# Patient Record
Sex: MALE | Race: WHITE | NOT HISPANIC OR LATINO | Employment: FULL TIME | URBAN - METROPOLITAN AREA
[De-identification: names, ages, dates, MRNs, and addresses within clinical notes are randomized per-mention and may not be internally consistent; named-entity substitution may affect disease eponyms.]

---

## 2017-01-04 ENCOUNTER — APPOINTMENT (OUTPATIENT)
Dept: LAB | Facility: CLINIC | Age: 67
End: 2017-01-04
Payer: COMMERCIAL

## 2017-01-04 ENCOUNTER — TRANSCRIBE ORDERS (OUTPATIENT)
Dept: LAB | Facility: CLINIC | Age: 67
End: 2017-01-04

## 2017-01-04 DIAGNOSIS — N40.1 ENLARGED PROSTATE WITH URINARY OBSTRUCTION: Primary | ICD-10-CM

## 2017-01-04 DIAGNOSIS — Z00.01 ENCOUNTER FOR GENERAL ADULT MEDICAL EXAMINATION WITH ABNORMAL FINDINGS: ICD-10-CM

## 2017-01-04 DIAGNOSIS — R53.83 OTHER MALAISE AND FATIGUE: ICD-10-CM

## 2017-01-04 DIAGNOSIS — E78.2 MIXED HYPERLIPIDEMIA: ICD-10-CM

## 2017-01-04 DIAGNOSIS — E55.9 UNSPECIFIED VITAMIN D DEFICIENCY: ICD-10-CM

## 2017-01-04 DIAGNOSIS — R53.81 OTHER MALAISE AND FATIGUE: ICD-10-CM

## 2017-01-04 DIAGNOSIS — N13.8 ENLARGED PROSTATE WITH URINARY OBSTRUCTION: Primary | ICD-10-CM

## 2017-01-04 DIAGNOSIS — E03.9 UNSPECIFIED HYPOTHYROIDISM: ICD-10-CM

## 2017-01-04 DIAGNOSIS — D64.9 ANEMIA, UNSPECIFIED: ICD-10-CM

## 2017-01-04 LAB — VENIPUNCTURE: NORMAL

## 2017-01-04 PROCEDURE — 36415 COLL VENOUS BLD VENIPUNCTURE: CPT

## 2017-01-05 ENCOUNTER — TRANSCRIBE ORDERS (OUTPATIENT)
Dept: LAB | Facility: CLINIC | Age: 67
End: 2017-01-05

## 2017-03-17 ENCOUNTER — APPOINTMENT (OUTPATIENT)
Dept: LAB | Facility: CLINIC | Age: 67
End: 2017-03-17
Payer: COMMERCIAL

## 2017-03-17 ENCOUNTER — TRANSCRIBE ORDERS (OUTPATIENT)
Dept: LAB | Facility: CLINIC | Age: 67
End: 2017-03-17

## 2017-03-17 DIAGNOSIS — E29.1 3-OXO-5 ALPHA-STEROID DELTA 4-DEHYDROGENASE DEFICIENCY: Primary | ICD-10-CM

## 2017-03-17 DIAGNOSIS — N52.9 IMPOTENCE OF ORGANIC ORIGIN: ICD-10-CM

## 2017-03-17 LAB
ANION GAP SERPL CALCULATED.3IONS-SCNC: 6 MMOL/L (ref 4–13)
BUN SERPL-MCNC: 27 MG/DL (ref 5–25)
CALCIUM SERPL-MCNC: 8.7 MG/DL (ref 8.3–10.1)
CHLORIDE SERPL-SCNC: 108 MMOL/L (ref 100–108)
CO2 SERPL-SCNC: 27 MMOL/L (ref 21–32)
CREAT SERPL-MCNC: 1.59 MG/DL (ref 0.6–1.3)
GFR SERPL CREATININE-BSD FRML MDRD: 43.5 ML/MIN/1.73SQ M
GLUCOSE P FAST SERPL-MCNC: 99 MG/DL (ref 65–99)
POTASSIUM SERPL-SCNC: 4.5 MMOL/L (ref 3.5–5.3)
PROLACTIN SERPL-MCNC: 7.9 NG/ML (ref 2.5–17.4)
SODIUM SERPL-SCNC: 141 MMOL/L (ref 136–145)
TESTOST SERPL-MCNC: 384.2 NG/DL (ref 241–827)
TSH SERPL DL<=0.05 MIU/L-ACNC: 1.1 UIU/ML (ref 0.36–3.74)

## 2017-03-17 PROCEDURE — 36415 COLL VENOUS BLD VENIPUNCTURE: CPT

## 2017-03-17 PROCEDURE — 84146 ASSAY OF PROLACTIN: CPT

## 2017-03-17 PROCEDURE — 84403 ASSAY OF TOTAL TESTOSTERONE: CPT

## 2017-03-17 PROCEDURE — 84443 ASSAY THYROID STIM HORMONE: CPT

## 2017-03-17 PROCEDURE — 80048 BASIC METABOLIC PNL TOTAL CA: CPT

## 2017-04-12 ENCOUNTER — TRANSCRIBE ORDERS (OUTPATIENT)
Dept: LAB | Facility: CLINIC | Age: 67
End: 2017-04-12

## 2017-04-12 ENCOUNTER — APPOINTMENT (OUTPATIENT)
Dept: LAB | Facility: CLINIC | Age: 67
End: 2017-04-12
Payer: COMMERCIAL

## 2017-04-12 DIAGNOSIS — R53.83 FATIGUE DUE TO DEPRESSION: ICD-10-CM

## 2017-04-12 DIAGNOSIS — E55.9 UNSPECIFIED VITAMIN D DEFICIENCY: ICD-10-CM

## 2017-04-12 DIAGNOSIS — E03.9 UNSPECIFIED HYPOTHYROIDISM: ICD-10-CM

## 2017-04-12 DIAGNOSIS — R73.09 OTHER ABNORMAL GLUCOSE: ICD-10-CM

## 2017-04-12 DIAGNOSIS — D64.9 ANEMIA, UNSPECIFIED: ICD-10-CM

## 2017-04-12 DIAGNOSIS — F32.A FATIGUE DUE TO DEPRESSION: ICD-10-CM

## 2017-04-12 DIAGNOSIS — E78.2 MIXED HYPERLIPIDEMIA: Primary | ICD-10-CM

## 2017-04-12 DIAGNOSIS — Z00.01 ENCOUNTER FOR GENERAL ADULT MEDICAL EXAMINATION WITH ABNORMAL FINDINGS: ICD-10-CM

## 2017-04-12 LAB
ALBUMIN SERPL BCP-MCNC: 3.3 G/DL (ref 3.5–5)
ALP SERPL-CCNC: 23 U/L (ref 46–116)
ALT SERPL W P-5'-P-CCNC: 12 U/L (ref 12–78)
ANION GAP SERPL CALCULATED.3IONS-SCNC: 7 MMOL/L (ref 4–13)
AST SERPL W P-5'-P-CCNC: 15 U/L (ref 5–45)
BASOPHILS # BLD AUTO: 0.03 THOUSANDS/ΜL (ref 0–0.1)
BASOPHILS NFR BLD AUTO: 0 % (ref 0–1)
BILIRUB SERPL-MCNC: 0.39 MG/DL (ref 0.2–1)
BUN SERPL-MCNC: 33 MG/DL (ref 5–25)
CALCIUM SERPL-MCNC: 8.3 MG/DL (ref 8.3–10.1)
CHLORIDE SERPL-SCNC: 106 MMOL/L (ref 100–108)
CO2 SERPL-SCNC: 25 MMOL/L (ref 21–32)
CREAT SERPL-MCNC: 1.67 MG/DL (ref 0.6–1.3)
EOSINOPHIL # BLD AUTO: 0.26 THOUSAND/ΜL (ref 0–0.61)
EOSINOPHIL NFR BLD AUTO: 4 % (ref 0–6)
ERYTHROCYTE [DISTWIDTH] IN BLOOD BY AUTOMATED COUNT: 14 % (ref 11.6–15.1)
EST. AVERAGE GLUCOSE BLD GHB EST-MCNC: 114 MG/DL
FERRITIN SERPL-MCNC: 96 NG/ML (ref 8–388)
GFR SERPL CREATININE-BSD FRML MDRD: 41 ML/MIN/1.73SQ M
GLUCOSE P FAST SERPL-MCNC: 99 MG/DL (ref 65–99)
HBA1C MFR BLD: 5.6 % (ref 4.2–6.3)
HCT VFR BLD AUTO: 38.9 % (ref 36.5–49.3)
HGB BLD-MCNC: 12.8 G/DL (ref 12–17)
IRON SATN MFR SERPL: 19 %
IRON SERPL-MCNC: 59 UG/DL (ref 65–175)
LYMPHOCYTES # BLD AUTO: 1.84 THOUSANDS/ΜL (ref 0.6–4.47)
LYMPHOCYTES NFR BLD AUTO: 26 % (ref 14–44)
MCH RBC QN AUTO: 31.1 PG (ref 26.8–34.3)
MCHC RBC AUTO-ENTMCNC: 32.9 G/DL (ref 31.4–37.4)
MCV RBC AUTO: 95 FL (ref 82–98)
MONOCYTES # BLD AUTO: 0.9 THOUSAND/ΜL (ref 0.17–1.22)
MONOCYTES NFR BLD AUTO: 13 % (ref 4–12)
NEUTROPHILS # BLD AUTO: 3.95 THOUSANDS/ΜL (ref 1.85–7.62)
NEUTS SEG NFR BLD AUTO: 57 % (ref 43–75)
NRBC BLD AUTO-RTO: 0 /100 WBCS
PLATELET # BLD AUTO: 211 THOUSANDS/UL (ref 149–390)
PMV BLD AUTO: 10.7 FL (ref 8.9–12.7)
POTASSIUM SERPL-SCNC: 4.1 MMOL/L (ref 3.5–5.3)
PROT SERPL-MCNC: 6.5 G/DL (ref 6.4–8.2)
RBC # BLD AUTO: 4.11 MILLION/UL (ref 3.88–5.62)
SODIUM SERPL-SCNC: 138 MMOL/L (ref 136–145)
TIBC SERPL-MCNC: 317 UG/DL (ref 250–450)
TSH SERPL DL<=0.05 MIU/L-ACNC: 1.02 UIU/ML (ref 0.36–3.74)
WBC # BLD AUTO: 6.99 THOUSAND/UL (ref 4.31–10.16)

## 2017-04-12 PROCEDURE — 36415 COLL VENOUS BLD VENIPUNCTURE: CPT

## 2017-04-12 PROCEDURE — 80053 COMPREHEN METABOLIC PANEL: CPT

## 2017-04-12 PROCEDURE — 82728 ASSAY OF FERRITIN: CPT

## 2017-04-12 PROCEDURE — 83540 ASSAY OF IRON: CPT

## 2017-04-12 PROCEDURE — 84443 ASSAY THYROID STIM HORMONE: CPT

## 2017-04-12 PROCEDURE — 83036 HEMOGLOBIN GLYCOSYLATED A1C: CPT

## 2017-04-12 PROCEDURE — 85025 COMPLETE CBC W/AUTO DIFF WBC: CPT

## 2017-04-12 PROCEDURE — 83550 IRON BINDING TEST: CPT

## 2019-02-26 ENCOUNTER — TRANSCRIBE ORDERS (OUTPATIENT)
Dept: PHYSICAL THERAPY | Facility: CLINIC | Age: 69
End: 2019-02-26

## 2019-02-26 ENCOUNTER — EVALUATION (OUTPATIENT)
Dept: PHYSICAL THERAPY | Facility: CLINIC | Age: 69
End: 2019-02-26
Payer: COMMERCIAL

## 2019-02-26 DIAGNOSIS — Z96.641 PRESENCE OF RIGHT ARTIFICIAL HIP JOINT: ICD-10-CM

## 2019-02-26 DIAGNOSIS — M25.551 RIGHT HIP PAIN: Primary | ICD-10-CM

## 2019-02-26 PROCEDURE — 97161 PT EVAL LOW COMPLEX 20 MIN: CPT | Performed by: PHYSICAL THERAPIST

## 2019-02-26 NOTE — PROGRESS NOTES
PT Evaluation     Today's date: 2019  Patient name: Brianna Emanuel  : [birth year]  MRN: [id number]  Referring provider: Judy Sierra MD  Dx:   Encounter Diagnosis     ICD-10-CM    1  Right hip pain M25 551    2  Presence of right artificial hip joint Z96 641                   Assessment  Assessment details: Brianna Emanuel is a 77 y o  male who presents with pain, decreased strength, decreased ROM, ambulatory dysfunction and LE edema  Due to these impairments, patient has difficulty performing ADL's, recreational activities, engaging in social activities, ambulation, stair negotiation, lifting/carrying, transfers  Patient's clinical presentation is consistent with their referring diagnosis of Right hip pain  (primary encounter diagnosis) w/ posterior/lat approach JUANI  Patient has been educated in post-op contraindications / precautions, weight bearing status and home exercise program  Patient would benefit from skilled physical therapy services to address his aforementioned functional limitations and progress towards prior level of function and independence with home exercise program      Impairments: abnormal gait, abnormal or restricted ROM, activity intolerance, impaired balance, impaired physical strength, lacks appropriate home exercise program, pain with function, weight-bearing intolerance and poor body mechanics  Functional limitations: non-recip stairs; ambul w/ SPC; avoiding sidelying per MD instructions, min difficulty w/ bed mobility & OOBUnderstanding of Dx/Px/POC: excellent  Goals  Short Term Goals to be accomplished in 4 weeks: (3/26/19)  STG1: Pt will be I with HEP  STG2: Pt will demo 50% improvement in hip AROM  STG3: Pt will demo 1/2 MMT strength inc hip  STG4: Pt will demo sit to stand transfer w/o UE assist and ease w/ bed mobility    STG5: Pt to D/C assistive device for level surfaces/indoors     Long Term Goals to be accomplished in 12 weeks: (19)   LTG1: Pt will achieve full hip R AROM vs L to allow reciprocal stairs and ease w/ LE dressing  LTG2: Pt will demo hip strength WNL to allow R SLS x 15" or more  LTG3: Pt will return to work/household duties as per PLOF without pain  LTG4: Abolish pain after prolonged sitting/standing/walking  LTG5: Pt to D/C A device for all ambulation  Plan  Plan details:       Patient would benefit from: PT eval and skilled physical therapy  Planned modality interventions: cryotherapy, thermotherapy: hydrocollator packs and unattended electrical stimulation  Planned therapy interventions: manual therapy, neuromuscular re-education, therapeutic activities, therapeutic exercise, home exercise program, stretching, patient education, postural training, balance/weight bearing training and functional ROM exercises  Frequency: 2x week  Duration in weeks: 8  Treatment plan discussed with: patient        Subjective Evaluation    History of Present Illness  Date of surgery: 2019  Mechanism of injury: Pt had elective JUANI due to R hip OA  PLOF included ambulation w/o A device w/ pain  Stairs were non-reciprocal prior to surgery due to pain  Pain  Current pain rating: 3  At best pain rating: 3  At worst pain ratin  Location: R buttock/hip and thigh  Quality: dull ache  Aggravating factors: sitting  Progression: improved    Social Support  Steps to enter house: yes (7 w/rail)  Stairs in house: yes (15 w/ rail; also has stair glide)   Lives in: multiple-level home  Lives with: spouse    Treatments  Treatments tried: home care PT    Current treatment: physical therapy  Patient Goals  Patient goals for therapy: increased motion and decreased pain  Patient goal: to be able to drive and to walk his dog        Objective     A/PROM:  R  L     19  Hip flex sup  45*  110  Hip abd sup  30  38  Hip ER sit  -4  32  Hip IR sit  22  27    MMT:    R  L     19  Hip flex   3-/5*  5/5  Hip abd  3-/5*  4+/5  Hip ER   3-/5*  5/5  Hip IR   4+/5  5/5  Knee flex  4-/5*  5/5  Knee ext  4/5  5/5    Lumbar screen: Lumbar AROM limitation   Flexion: mod garrett   Extension: mod/dominic garrett   R SG: mod garrett   L SG: mod garrett      Balance:   Tandem R posterior w/ EO: 14"  Tandem L posterior w/ EO: 30" (+)  SLS R w/ EO: unable  SLS L w/ EO: 10"    Function: stairs are non reciprocal - pt can perform maximum height of 4" step up R LE; standing tolerance estimated at 10'; sleep mildly disturbed due to discomfort and positional restrictions; pt is unable to squat; min difficulty w/ bed mobility and transfers    Gait: Pt ambulates w/ SPC w/ wide AYLIN and mildy antalgic gait and Trendellenberg pattern  Palpation: TTP lateral hip     Flexibility:  NT - precautions  Precautions: posterior approach JUANI precautions, htn; B/L knee OA - had stem cell injections    Daily Treatment Diary   Date  Visit # 2/26/19  1       Manual                                There Exer         bridges 1x10       Hip abd HL grn 3"x10       At rail alt hip flexion 1x10 each       At rail alt hip abd 1x10 each               There activ          FSU w/rail 4" 1x10       Sidestep w/ TB W/rail 10'x1 L/R   yellow                       HEP updated       NMRed                                                Modalities        CP 10'

## 2019-02-26 NOTE — LETTER
2019    Harvinder Martin MD  [facility]    Patient: Alexys Viera   YOB: 1950   Date of Visit: 2019     Encounter Diagnosis     ICD-10-CM    1  Right hip pain M25 551    2  Presence of right artificial hip joint Z96 641        Dear Dr Kanu Gifford:    Please review the attached Plan of Care from [address] [facility] recent visit  Please verify that you agree therapy should continue by signing the attached document and sending it back to our office  If you have any questions or concerns, please don't hesitate to call  Sincerely,    Katelyn Hidalgo, PT      Referring Provider:      I certify that I have read the below Plan of Care and certify the need for these services furnished under this plan of treatment while under my care  Harvinder Martin MD  [address] [facility] [address]: [phone]          PT Evaluation     Today's date: 2019  Patient name: Alexys Viera  : [birth year]4  MRN: [id number]  Referring provider: Estefany Del Rosario MD  Dx:   Encounter Diagnosis     ICD-10-CM    1  Right hip pain M25 551    2  Presence of right artificial hip joint Z96 641                   Assessment  Assessment details: Alexys Viera is a 77 y o  male who presents with pain, decreased strength, decreased ROM, ambulatory dysfunction and LE edema  Due to these impairments, patient has difficulty performing ADL's, recreational activities, engaging in social activities, ambulation, stair negotiation, lifting/carrying, transfers  Patient's clinical presentation is consistent with their referring diagnosis of Right hip pain  (primary encounter diagnosis) w/ posterior/lat approach JUANI     Patient has been educated in post-op contraindications / precautions, weight bearing status and home exercise program  Patient would benefit from skilled physical therapy services to address his aforementioned functional limitations and progress towards prior level of function and independence with home exercise program      Impairments: abnormal gait, abnormal or restricted ROM, activity intolerance, impaired balance, impaired physical strength, lacks appropriate home exercise program, pain with function, weight-bearing intolerance and poor body mechanics  Functional limitations: non-recip stairs; ambul w/ SPC; avoiding sidelying per MD instructions, min difficulty w/ bed mobility & OOBUnderstanding of Dx/Px/POC: excellent  Goals  Short Term Goals to be accomplished in 4 weeks: (3/26/19)  STG1: Pt will be I with HEP  STG2: Pt will demo 50% improvement in hip AROM  STG3: Pt will demo 1/2 MMT strength inc hip  STG4: Pt will demo sit to stand transfer w/o UE assist and ease w/ bed mobility  STG5: Pt to D/C assistive device for level surfaces/indoors     Long Term Goals to be accomplished in 12 weeks: (5/21/19)   LTG1: Pt will achieve full hip R AROM vs L to allow reciprocal stairs and ease w/ LE dressing  LTG2: Pt will demo hip strength WNL to allow R SLS x 15" or more  LTG3: Pt will return to work/household duties as per PLOF without pain  LTG4: Abolish pain after prolonged sitting/standing/walking  LTG5: Pt to D/C A device for all ambulation  Plan  Plan details:       Patient would benefit from: PT eval and skilled physical therapy  Planned modality interventions: cryotherapy, thermotherapy: hydrocollator packs and unattended electrical stimulation  Planned therapy interventions: manual therapy, neuromuscular re-education, therapeutic activities, therapeutic exercise, home exercise program, stretching, patient education, postural training, balance/weight bearing training and functional ROM exercises  Frequency: 2x week  Duration in weeks: 8  Treatment plan discussed with: patient        Subjective Evaluation    History of Present Illness  Date of surgery: 2/14/2019  Mechanism of injury: Pt had elective JUANI due to R hip OA  PLOF included ambulation w/o A device w/ pain  Stairs were non-reciprocal prior to surgery due to pain  Pain  Current pain rating: 3  At best pain rating: 3  At worst pain ratin  Location: R buttock/hip and thigh  Quality: dull ache  Aggravating factors: sitting  Progression: improved    Social Support  Steps to enter house: yes (7 w/rail)  Stairs in house: yes (15 w/ rail; also has stair glide)   Lives in: multiple-level home  Lives with: spouse    Treatments  Treatments tried: home care PT  Current treatment: physical therapy  Patient Goals  Patient goals for therapy: increased motion and decreased pain  Patient goal: to be able to drive and to walk his dog        Objective     A/PROM:  R  L     19  Hip flex sup  45*  110  Hip abd sup  30  38  Hip ER sit  -4  32  Hip IR sit  22  27    MMT:    R  L     19  Hip flex   3-/5*  5/5  Hip abd  3-/5*  4+/5  Hip ER   3-/5*  5/5  Hip IR   4+/5  5/5  Knee flex  4-/5*  5/5  Knee ext  4/5  5/5    Lumbar screen: Lumbar AROM limitation   Flexion: mod garrett   Extension: mod/dominic garrett   R SG: mod garrett   L SG: mod garrett      Balance:   Tandem R posterior w/ EO: 14"  Tandem L posterior w/ EO: 30" (+)  SLS R w/ EO: unable  SLS L w/ EO: 10"    Function: stairs are non reciprocal - pt can perform maximum height of 4" step up R LE; standing tolerance estimated at 10'; sleep mildly disturbed due to discomfort and positional restrictions; pt is unable to squat; min difficulty w/ bed mobility and transfers    Gait: Pt ambulates w/ SPC w/ wide AYLIN and mildy antalgic gait and Trendellenberg pattern  Palpation: TTP lateral hip     Flexibility:  NT - precautions          Precautions: posterior approach JUANI precautions, htn; B/L knee OA - had stem cell injections    Daily Treatment Diary   Date  Visit # 19  1       Manual                                There Exer         bridges 1x10       Hip abd HL grn 3"x10       At rail alt hip flexion 1x10 each       At rail alt hip abd 1x10 each               There activ         FSU w/rail 4" 1x10       Sidestep w/ TB W/rail 10'x1 L/R   yellow                       HEP updated       NMRed                                                Modalities        CP 10'

## 2019-03-04 ENCOUNTER — OFFICE VISIT (OUTPATIENT)
Dept: PHYSICAL THERAPY | Facility: CLINIC | Age: 69
End: 2019-03-04
Payer: COMMERCIAL

## 2019-03-04 DIAGNOSIS — M25.551 RIGHT HIP PAIN: Primary | ICD-10-CM

## 2019-03-04 DIAGNOSIS — Z96.641 PRESENCE OF RIGHT ARTIFICIAL HIP JOINT: ICD-10-CM

## 2019-03-04 PROCEDURE — 97110 THERAPEUTIC EXERCISES: CPT | Performed by: PHYSICAL THERAPIST

## 2019-03-04 NOTE — PROGRESS NOTES
Daily Note     Today's date: 3/4/2019  Patient name: Shannon Carpio  :   MRN: [id number]  Referring provider: Key Noble MD  Dx:   Encounter Diagnosis     ICD-10-CM    1  Right hip pain M25 551    2  Presence of right artificial hip joint Z96 641                   Subjective: Pt asks about how to roll onto his side  Objective: See treatment diary below; instructed pt for use of pillow between knees for sidelying    Precautions: posterior approach JUANI precautions, htn; B/L knee OA - had stem cell injections    Daily Treatment Diary   Date  Visit # 19  1 3/4/19      Manual                                There Exer  40'       bridges 1x10 5" 2x10      Hip abd HL grn 3"x10       At rail alt hip flexion 1x10 each 1x10 each      At rail alt hip abd 1x10 each 1x10 each      Clamshells w/ pillow  2x5      Long leg ER  Yellow 2x10      miriam hip ER sit  2x10      TB hams  grn 2x10      Prone B/L knee flexion  2x10                      FSU w/rail 4" 1x10 6" 2x10      Sidestep w/ TB W/rail 10'x1 L/R   yellow W/ rail  L/R 10'x2 each  yellow      Sit to stands chair  2' airex pad  2x10              HEP updated               2 cone taps  1x10 each      Mod Tandem R posterior on airex pad  1'                              Modalities        CP 10' 10'                            Assessment: Tolerated treatment well and reports good challenge to balance and strength (of hip flexor and ext rotat)  Patient demonstrated fatigue post treatment and would benefit from continued PT  Pt able to roll on plinth safely w/ pillow between knees  Plan: Progress treatment as tolerated

## 2019-03-06 ENCOUNTER — OFFICE VISIT (OUTPATIENT)
Dept: PHYSICAL THERAPY | Facility: CLINIC | Age: 69
End: 2019-03-06
Payer: COMMERCIAL

## 2019-03-06 DIAGNOSIS — Z96.641 PRESENCE OF RIGHT ARTIFICIAL HIP JOINT: ICD-10-CM

## 2019-03-06 DIAGNOSIS — M25.551 RIGHT HIP PAIN: Primary | ICD-10-CM

## 2019-03-06 PROCEDURE — 97110 THERAPEUTIC EXERCISES: CPT | Performed by: PHYSICAL THERAPIST

## 2019-03-06 NOTE — PROGRESS NOTES
Daily Note     Today's date: 3/6/2019  Patient name: Tucker Ramey  : [birth year]  MRN: [id number]  Referring provider: Espinoza Liang MD  Dx:   Encounter Diagnosis     ICD-10-CM    1  Right hip pain M25 551    2  Presence of right artificial hip joint Z96 641                   Subjective: Pt states he is doing pretty well  Still stiff w/ getting out of car and after prolonged sitting  Objective: See treatment diary below  Precautions: posterior approach JUANI precautions, htn; B/L knee OA - had stem cell injections    Daily Treatment Diary   Date  Visit # 19  1 3/4/19 3/6/19  3     Manual                                There Exer  40' 30'      bridges 1x10 5" 2x10 10"x10     Hip abd HL grn 3"x10       At rail alt hip flexion 1x10 each 1x10 each      At rail alt hip abd 1x10 each 1x10 each      Clamshells w/ pillow  2x5 2x10     Long leg ER  Yellow 2x10      miriam hip ER sit  2x10      TB hams  grn 2x10      Prone B/L knee flexion  2x10 WALTER  2x10     Alt hip ext   Red 2x10 each     TB hip ER sit   Red 2x10     FSU w/rail 4" 1x10 6" 2x10 6" no rail  2x10     Sidestep w/ TB W/rail 10'x1 L/R   yellow W/ rail  L/R 10'x2 each  yellow No rail  10'x4 R/L     Sit to stands chair  2' airex pad  2x10 2" airex pad  2x10     Hip flexor str   Off edge 1'x2     HEP updated               2 cone taps  1x10 each 2x10 each     Mod Tandem R posterior on airex pad  1'                              Modalities        CP 10' 10' 10'                           Assessment: Tolerated treatment well  Patient demonstrated fatigue post treatment and improved ease/confidence w/ bed mobility      Plan: Progress treatment as tolerated  Progress treament per protocol

## 2019-03-11 ENCOUNTER — APPOINTMENT (OUTPATIENT)
Dept: PHYSICAL THERAPY | Facility: CLINIC | Age: 69
End: 2019-03-11
Payer: COMMERCIAL

## 2019-03-12 ENCOUNTER — OFFICE VISIT (OUTPATIENT)
Dept: PHYSICAL THERAPY | Facility: CLINIC | Age: 69
End: 2019-03-12
Payer: COMMERCIAL

## 2019-03-12 DIAGNOSIS — M25.551 RIGHT HIP PAIN: Primary | ICD-10-CM

## 2019-03-12 DIAGNOSIS — Z96.641 PRESENCE OF RIGHT ARTIFICIAL HIP JOINT: ICD-10-CM

## 2019-03-12 PROCEDURE — 97110 THERAPEUTIC EXERCISES: CPT | Performed by: PHYSICAL THERAPIST

## 2019-03-12 NOTE — PROGRESS NOTES
Daily Note     Today's date: 3/12/2019  Patient name: Giovanna Roach  : [birth year]  MRN: [id number]  Referring provider: Chet Servin MD  Dx:   Encounter Diagnosis     ICD-10-CM    1  Right hip pain M25 551    2  Presence of right artificial hip joint Z96 641                   Subjective: Pt states he was able to put shoes on indep for first time today  He is reporting improved mobility and decreased discomfort overall  Objective: See treatment diary below  Precautions: posterior approach JUANI precautions, htn; B/L knee OA - had stem cell injections    Daily Treatment Diary   Date  Visit # 19  1 3/4/19 3/6/19  3 3/11/19  4    Manual                                There Exer  40' 27' 30'     bridges 1x10 5" 2x10 10"x10 10x10"    Hip abd HL grn 3"x10       At rail alt hip flexion 1x10 each 1x10 each      At rail alt hip abd 1x10 each 1x10 each      Clamshells w/ pillow  2x5 2x10 Yellow  2x10    Long leg ER  Yellow 2x10      miriam hip ER sit  2x10  LAQ red 2x10    TB hams  grn 2x10  grn 30x    Prone B/L knee flexion  2x10 WALTER  2x10 WALTER  3x10    Alt hip ext   Red 2x10 each Red 20x    TB hip ER sit   Red 2x10 Red 2x10    FSU w/rail 4" 1x10 6" 2x10 6" no rail  2x10 6" no rail  2x10    Sidestep w/ TB W/rail 10'x1 L/R   yellow W/ rail  L/R 10'x2 each  yellow No rail  10'x4 R/L No rail  15'x2 R/L    Sit to stands chair  2' airex pad  2x10 2" airex pad  2x10 2" airex pad  2x10    Hip flexor str   Off edge 1'x2 Off edge 1'    HEP updated       Alt cone taps w/ ambulat    8 cones CGA  4x    2 cone taps  1x10 each 2x10 each 1x10 each    Mod Tandem R posterior on airex pad  1'      Leg press supine B/L    50#  2x10    Leg press supine R uni    20#  2x10    t mill    1 5 x 5'    Modalities        CP 10' 10' 10' 5'                          Assessment: Tolerated treatment well and is able to advance POC today    Patient demonstrated fatigue post treatment and is reporting increased function  He is not using  SPC        Plan: Progress treament per protocol

## 2019-03-13 ENCOUNTER — OFFICE VISIT (OUTPATIENT)
Dept: PHYSICAL THERAPY | Facility: CLINIC | Age: 69
End: 2019-03-13
Payer: COMMERCIAL

## 2019-03-13 DIAGNOSIS — M25.551 RIGHT HIP PAIN: Primary | ICD-10-CM

## 2019-03-13 DIAGNOSIS — Z96.641 PRESENCE OF RIGHT ARTIFICIAL HIP JOINT: ICD-10-CM

## 2019-03-13 PROCEDURE — 97110 THERAPEUTIC EXERCISES: CPT | Performed by: PHYSICAL THERAPIST

## 2019-03-13 NOTE — PROGRESS NOTES
Daily Note     Today's date: 3/13/2019  Patient name: Ron Giordano  : [birth year]  MRN: [id number]  Referring provider: Rosalinda Gooden MD  Dx:   Encounter Diagnosis     ICD-10-CM    1  Right hip pain M25 551    2  Presence of right artificial hip joint Z96 641                   Subjective: Pt states he's able to negotiate stairs reciprocally w/ rail now  He still has difficulty w/ active hip flexion        Objective: See treatment diary below  Precautions: posterior approach JUANI precautions, htn; B/L knee OA - had stem cell injections    Daily Treatment Diary   Date  Visit # 19  1 3/4/19 3/6/19  3 3/11/19  4 3/13/19  5  FOTO   Manual                                There Exer  40' 30' 30' 45'    bridges 1x10 5" 2x10 10"x10 10x10" 10x10" w/ feet on pball   Hip abd HL grn 3"x10       At rail alt hip flexion 1x10 each 1x10 each   Supine alt march 2x10   At rail alt hip abd 1x10 each 1x10 each   sidelying   2x10   Clamshells w/ pillow  2x5 2x10 Yellow  2x10 Yellow   2x10   Long leg ER  Yellow 2x10      miriam hip ER sit  2x10  LAQ red 2x10 LAQ red 2x10   TB hams  grn 2x10  grn 30x    Prone B/L knee flexion  2x10 WALTER  2x10 WALTER  3x10    Alt hip ext   Red 2x10 each Red 20x    TB hip ER sit   Red 2x10 Red 2x10 Red 2x10   FSU w/rail 4" 1x10 6" 2x10 6" no rail  2x10 6" no rail  2x10 8" no rail 1x10   Sidestep w/ TB W/rail 10'x1 L/R   yellow W/ rail  L/R 10'x2 each  yellow No rail  10'x4 R/L No rail  15'x2 R/L    Sit to stands chair  2' airex pad  2x10 2" airex pad  2x10 2" airex pad  2x10 2" airex pad w/ OH press 5# R/L 2x10   Hip flexor str   Off edge 1'x2 Off edge 1'    HEP updated       Alt cone taps w/ ambulat    8 cones CGA  4x 8 cones   CS 4x   2 cone taps  1x10 each 2x10 each 1x10 each    Mod Tandem R posterior on airex pad  1'   Tandem w/ lat pull down  10# 1x15 each   Leg press supine B/L    50#  2x10 50#  3x10   Leg press supine R uni    20#  2x10 20#  3x10   SLS 3 way SLR @ griselda      1x10 ea R/L  3 5# (R flex2#) t mill    1 5 x 5' 1 7x7'   Modalities        CP 10' 10' 10' 5' 5'                           Assessment: Tolerated treatment well and pt is advancing well  Patient demonstrated fatigue post treatment and reports improving function      Plan: Progress treament per protocol

## 2019-03-18 ENCOUNTER — OFFICE VISIT (OUTPATIENT)
Dept: PHYSICAL THERAPY | Facility: CLINIC | Age: 69
End: 2019-03-18
Payer: COMMERCIAL

## 2019-03-18 DIAGNOSIS — Z96.641 PRESENCE OF RIGHT ARTIFICIAL HIP JOINT: ICD-10-CM

## 2019-03-18 DIAGNOSIS — M25.551 RIGHT HIP PAIN: Primary | ICD-10-CM

## 2019-03-18 PROCEDURE — 97110 THERAPEUTIC EXERCISES: CPT | Performed by: PHYSICAL THERAPIST

## 2019-03-18 NOTE — PROGRESS NOTES
Daily Note     Today's date: 3/18/2019  Patient name: Wendy Edge  : [birth year]  MRN: [id number]  Referring provider: Mario Maier MD  Dx:   Encounter Diagnosis     ICD-10-CM    1  Right hip pain M25 551    2  Presence of right artificial hip joint Z96 641                   Subjective: Pt states he is continuing to improve  He didn't have any significant pain over the weekend  Objective: See treatment diary below  FOTO: 3/13/19  Precautions: posterior approach JUANI precautions, htn; B/L knee OA - had stem cell injections    Daily Treatment Diary   Date  Visit # 3/18/19  6      Manual                            There Exer 45'       bridges w/ feet on pball 10x10"      Supine alt marching 30x each      Hip abd SLR 2x10      Clamshells w/ pillow yellow      LAQ Red 3x10      WALTER B/L knee flexion 3x10      Alt hip ext       TB hip ER sit Red 3x10      FSU w/rail 8"  2x10      Hip flexor str 1'x2 off edge      Alt cone taps w/ ambulat       Tandem w/ Habd Blue  1x15 each      Leg press supine B/L 55#  3x10      Leg press supine R uni 25#  3x10      SLS 3 way SLR @ griselda  3 5#  1x15 each R/L      Chair to stand w/ OH press 2" airex pad  5#B/L UE 2x10      Sidestep w/ TB Red 15'x1 R/L      diag walk w/TB Red 15'x2      Qped hip ext 5" 1x10 each      t mill 2 1x8'      Modalities       CP 10'                        Assessment: Tolerated treatment well  Patient demonstrated fatigue post treatment and does not c/o pain during session  Pt demonstrates improved ease w/ bed mobility      Plan: Progress treament per protocol

## 2019-03-21 ENCOUNTER — APPOINTMENT (OUTPATIENT)
Dept: PHYSICAL THERAPY | Facility: CLINIC | Age: 69
End: 2019-03-21
Payer: COMMERCIAL

## 2019-03-22 ENCOUNTER — OFFICE VISIT (OUTPATIENT)
Dept: PHYSICAL THERAPY | Facility: CLINIC | Age: 69
End: 2019-03-22
Payer: COMMERCIAL

## 2019-03-22 DIAGNOSIS — M25.551 RIGHT HIP PAIN: Primary | ICD-10-CM

## 2019-03-22 PROCEDURE — 97140 MANUAL THERAPY 1/> REGIONS: CPT

## 2019-03-22 PROCEDURE — 97110 THERAPEUTIC EXERCISES: CPT

## 2019-03-22 NOTE — PROGRESS NOTES
Daily Note     Today's date: 3/22/2019  Patient name: Joseph Hansen  : [birth year]  MRN: [id number]  Referring provider: Bianca France MD  Dx:   Encounter Diagnosis     ICD-10-CM    1  Right hip pain M25 551                   Subjective: Pt states he is continuing to improve  Still using raised toilet seat but easier to get up      Objective: See treatment diary below  FOTO: 3/13/19  Precautions: posterior approach JUANI precautions, htn; B/L knee OA - had stem cell injections    Daily Treatment Diary   Date  Visit # 3/18/19  6 3/22  7     Manual  10 min     Neuro strech  Sciatic Piriformis     Stick rolling quad  5 min            There Exer 45' 30 min      bridges w/ feet on pball 10x10" 20 x 5 sec     Supine alt marching 30x each 2 x 10     Hip abd SLR 2x10 2 x 10     Clamshells w/ pillow yellow Red 2 x 10     LAQ Red 3x10      WALTER B/L knee flexion 3x10      Alt hip ext       TB hip ER sit Red 3x10      FSU w/rail 8"  2x10 8 in 2 x 10     Hip flexor str 1'x2 off edge Manual 30 sec x 2     Alt cone taps w/ ambulat       Tandem w/ Habd Blue  1x15 each      Leg press supine B/L 55#  3x10      Leg press supine R uni 25#  3x10      SLS 3 way SLR @ griselda  3 5#  1x15 each R/L      Chair to stand w/ OH press 2" airex pad  5#B/L UE 2x10 2 x 10 with SOS     Sidestep w/ TB Red 15'x1 R/L      diag walk w/TB Red 15'x2      Qped hip ext 5" 1x10 each      t mill 2 1x8'      Modalities       CP 10'                        Assessment: Tolerated treatment well  Patient demonstrated fatigue post treatment and does not c/o pain during session  Plan: Progress treament per protocol

## 2019-03-25 ENCOUNTER — EVALUATION (OUTPATIENT)
Dept: PHYSICAL THERAPY | Facility: CLINIC | Age: 69
End: 2019-03-25
Payer: COMMERCIAL

## 2019-03-25 ENCOUNTER — TRANSCRIBE ORDERS (OUTPATIENT)
Dept: PHYSICAL THERAPY | Facility: CLINIC | Age: 69
End: 2019-03-25

## 2019-03-25 DIAGNOSIS — Z96.641 PRESENCE OF RIGHT ARTIFICIAL HIP JOINT: ICD-10-CM

## 2019-03-25 DIAGNOSIS — M25.551 RIGHT HIP PAIN: Primary | ICD-10-CM

## 2019-03-25 PROCEDURE — 97110 THERAPEUTIC EXERCISES: CPT | Performed by: PHYSICAL THERAPIST

## 2019-03-25 PROCEDURE — 97140 MANUAL THERAPY 1/> REGIONS: CPT | Performed by: PHYSICAL THERAPIST

## 2019-03-25 NOTE — PROGRESS NOTES
PT Evaluation     Today's date: 3/25/2019  Patient name: Ivan Veras  : [birth year]  MRN: [id number]  Referring provider: Denny Crawley MD  Dx:   Encounter Diagnosis     ICD-10-CM    1  Right hip pain M25 551    2  Presence of right artificial hip joint Z96 641                   Assessment  Assessment details: Ivan Veras is a 77 y o  male who has attended 8 visits thus far  He demonstrates improvement in all areas, but continues w/ mild/mod intermittent pain, decreased strength, decreased ROM, ambulatory dysfunction and mid LE edema  Due to these impairments, patient has difficulty performing ADL's (R LE dressing), recreational activities, engaging in social activities, ambulation, stair negotiation, lifting/carrying  Patient's clinical presentation is consistent with their referring diagnosis of Right hip pain  (primary encounter diagnosis) w/ posterior/lat approach JUANI  Patient has been educated in post-op contraindications / precautions, weight bearing status and home exercise program progression  Patient is progressing well/as expected  He would benefit from continued skilled physical therapy services as per original POC to further address his aforementioned functional limitations and progress towards prior level of function and independence with home exercise program      Impairments: abnormal gait, abnormal or restricted ROM, activity intolerance, impaired balance, impaired physical strength, lacks appropriate home exercise program, pain with function, weight-bearing intolerance and poor body mechanics  Understanding of Dx/Px/POC: excellent  Goals  Short Term Goals to be accomplished in 4 weeks: (3/26/19) - met  STG1: Pt will be I with HEP  STG2: Pt will demo 50% improvement in hip AROM  STG3: Pt will demo 1/2 MMT strength inc hip  STG4: Pt will demo sit to stand transfer w/o UE assist and ease w/ bed mobility    STG5: Pt to D/C assistive device for level surfaces/indoors     Long Term Goals to be accomplished in 12 weeks: (19) - ongoing  LTG1: Pt will achieve full hip R AROM vs L to allow reciprocal stairs and ease w/ LE dressing  LTG2: Pt will demo hip strength WNL to allow R SLS x 15" or more  LTG3: Pt will return to work/household duties as per PLOF without pain  LTG4: Abolish pain after prolonged sitting/standing/walking  LTG5: Pt to D/C A device for all ambulation  Plan  Plan details:       Patient would benefit from: PT eval and skilled physical therapy  Planned modality interventions: cryotherapy, thermotherapy: hydrocollator packs and unattended electrical stimulation  Planned therapy interventions: manual therapy, neuromuscular re-education, therapeutic activities, therapeutic exercise, home exercise program, stretching, patient education, postural training, balance/weight bearing training and functional ROM exercises  Frequency: 2x week  Plan of Care expiration date: 2019  Treatment plan discussed with: patient        Subjective Evaluation    History of Present Illness  Date of surgery: 2019  Mechanism of injury: Pt had elective JUANI due to R hip OA  PLOF included ambulation w/o A device w/ pain  Stairs were non-reciprocal prior to surgery due to pain  Quality of life: good    Pain  Current pain ratin  At best pain ratin  At worst pain ratin  Location: R buttock/hip and thigh  Quality: dull ache  Aggravating factors: sitting  Progression: improved    Social Support  Steps to enter house: yes (7 w/rail)  Stairs in house: yes (15 w/ rail; also has stair glide)   Lives in: multiple-level home  Lives with: spouse    Treatments  Treatments tried: home care PT    Current treatment: physical therapy  Patient Goals  Patient goals for therapy: increased motion and decreased pain  Patient goal: to be able to drive and to walk his dog - both met        Objective    Flowsheet Rows      Most Recent Value   PT/OT G-Codes   Current Score  55   Projected Score  61 A/PROM:  R  R  L     3/25/19 2/26/19 2/16/19  Hip flex sup  90  45*  110  Hip abd sup  30  30  38  Hip ER sit  15  -4  32  Hip IR sit  27  22  27    MMT:    R  R  L     3/25/19 2/26/19 2/26/19  Hip flex   3+/5  3-/5*  5/5  Hip abd  4/5  3-/5*  4+/5  Hip ER   3+/5  3-/5*  5/5  Hip IR   4+/5  4+/5  5/5  Knee flex  4/5  4-/5*  5/5  Knee ext  4+/5  4/5  5/5      Balance:   3/25/19 2/26/19  Tandem R post w/ EO: 30"(+)  14"  Tandem L post w/ EO: 30"(+)  30" (+)  SLS R w/ EO:  9"   unable  SLS L w/ EO:   25"  10"    Function: stairs are now reciprocal w/ rail vs non-reciprocal at evaluation  Standing tolerance is unlimited vs limited to 10' at evaluation; sleep is not disturbed by hip discomfort vs mildly disturbed at evaluation  Pt is able to perform partial squat vs unable to squat at evaluation; no diffculty w/ bed mobility and transfers vs min garrett at evaluation  Pt still using raised toilet seat  He feels balance remains a little limited, and does still use A device to don socks R foot  Gait: Pt ambulates w/o assistive device - he has D/C SPC   Mild Trendellenberg pattern    Palpation: TTP lateral hip          Precautions: posterior approach JUANI precautions, htn; B/L knee OA - had stem cell injections    Daily Treatment Diary   Date  Visit # 3/18/19  6 3/22  7 3/25/19  8  RE/FOTO     Manual  10 min 10;     Neuro strech  Sciatic Piriformis Sciatic pirif and femoral      Stick rolling quad  5 min 5'             There Exer 45' 30 min 30'      bridges w/ feet on pball 10x10" 20 x 5 sec Single leg  2x10     Supine alt marching 30x each 2 x 10 High marching  20'x2     Hip abd SLR 2x10 2 x 10 2x10     Clamshells w/ pillow yellow Red 2 x 10      LAQ Red 3x10       WALTER B/L knee flexion 3x10       Alt hip ext        TB hip ER sit Red 3x10  Red 3x10     FSU w/rail 8"  2x10 8 in 2 x 10 8" 2x10 each   no rail     Hip flexor str 1'x2 off edge Manual 30 sec x 2 manual     Tandem w/ Habd Blue  1x15 each       Leg press supine B/L 55#  3x10  60#  3x10     Leg press supine R uni 25#  3x10  3#  3x10 each     SLS 3 way SLR @ griselda  3 5#  1x15 each R/L       Chair to stand w/ OH press 2" airex pad  5#B/L UE 2x10 2 x 10 with SOS 2x10 w/ SOS     Sidestep w/ TB Red 15'x1 R/L  Red 15'x2 R/L     diag walk w/TB Red 15'x2       Qped hip ext 5" 1x10 each  opp UE/LE   5"x10 each     t mill 2 1x8'  2 1x8'     Modalities        CP 10'  10'

## 2019-03-25 NOTE — LETTER
2019    Jamar Cunningham MD  [facility]    Patient: Nicholas Moctezuma   YOB: 1950   Date of Visit: 3/25/2019     Encounter Diagnosis     ICD-10-CM    1  Right hip pain M25 551    2  Presence of right artificial hip joint Z96 641        Dear Dr Nathan Pack:    Please review the attached Plan of Care from [address] [facility] recent visit  Please verify that you agree therapy should continue by signing the attached document and sending it back to our office  If you have any questions or concerns, please don't hesitate to call  Sincerely,    aJmes Steve, PT      Referring Provider:      I certify that I have read the below Plan of Care and certify the need for these services furnished under this plan of treatment while under my care  Jamar Cunningham MD  [address] [facility] [address]: [phone]          PT Evaluation     Today's date: 3/25/2019  Patient name: Nicholas Moctezuma  :   MRN: [id number]  Referring provider: Raghu Mayers MD  Dx:   Encounter Diagnosis     ICD-10-CM    1  Right hip pain M25 551    2  Presence of right artificial hip joint Z96 641                   Assessment  Assessment details: Nicholas Moctezuma is a 77 y o  male who has attended 8 visits thus far  He demonstrates improvement in all areas, but continues w/ mild/mod intermittent pain, decreased strength, decreased ROM, ambulatory dysfunction and mid LE edema  Due to these impairments, patient has difficulty performing ADL's (R LE dressing), recreational activities, engaging in social activities, ambulation, stair negotiation, lifting/carrying  Patient's clinical presentation is consistent with their referring diagnosis of Right hip pain  (primary encounter diagnosis) w/ posterior/lat approach JUANI  Patient has been educated in post-op contraindications / precautions, weight bearing status and home exercise program progression   Patient is progressing well/as expected  He would benefit from continued skilled physical therapy services as per original POC to further address his aforementioned functional limitations and progress towards prior level of function and independence with home exercise program      Impairments: abnormal gait, abnormal or restricted ROM, activity intolerance, impaired balance, impaired physical strength, lacks appropriate home exercise program, pain with function, weight-bearing intolerance and poor body mechanics  Understanding of Dx/Px/POC: excellent  Goals  Short Term Goals to be accomplished in 4 weeks: (3/26/19) - met  STG1: Pt will be I with HEP  STG2: Pt will demo 50% improvement in hip AROM  STG3: Pt will demo 1/2 MMT strength inc hip  STG4: Pt will demo sit to stand transfer w/o UE assist and ease w/ bed mobility  STG5: Pt to D/C assistive device for level surfaces/indoors     Long Term Goals to be accomplished in 12 weeks: (5/21/19) - ongoing  LTG1: Pt will achieve full hip R AROM vs L to allow reciprocal stairs and ease w/ LE dressing  LTG2: Pt will demo hip strength WNL to allow R SLS x 15" or more  LTG3: Pt will return to work/household duties as per PLOF without pain  LTG4: Abolish pain after prolonged sitting/standing/walking  LTG5: Pt to D/C A device for all ambulation          Plan  Plan details:       Patient would benefit from: PT eval and skilled physical therapy  Planned modality interventions: cryotherapy, thermotherapy: hydrocollator packs and unattended electrical stimulation  Planned therapy interventions: manual therapy, neuromuscular re-education, therapeutic activities, therapeutic exercise, home exercise program, stretching, patient education, postural training, balance/weight bearing training and functional ROM exercises  Frequency: 2x week  Plan of Care expiration date: 5/21/2019  Treatment plan discussed with: patient        Subjective Evaluation    History of Present Illness  Date of surgery: 2/14/2019  Mechanism of injury: Pt had elective JUANI due to R hip OA  PLOF included ambulation w/o A device w/ pain  Stairs were non-reciprocal prior to surgery due to pain  Quality of life: good    Pain  Current pain ratin  At best pain ratin  At worst pain ratin  Location: R buttock/hip and thigh  Quality: dull ache  Aggravating factors: sitting  Progression: improved    Social Support  Steps to enter house: yes (7 w/rail)  Stairs in house: yes (15 w/ rail; also has stair glide)   Lives in: multiple-level home  Lives with: spouse    Treatments  Treatments tried: home care PT  Current treatment: physical therapy  Patient Goals  Patient goals for therapy: increased motion and decreased pain  Patient goal: to be able to drive and to walk his dog - both met        Objective    Flowsheet Rows      Most Recent Value   PT/OT G-Codes   Current Score  55   Projected Score  61       A/PROM:  R  R  L     3/25/19 2/26/19 2/16/19  Hip flex sup  90  45*  110  Hip abd sup  30  30  38  Hip ER sit  15  -4  32  Hip IR sit  27  22  27    MMT:    R  R  L     3/25/19 2/26/19 2/26/19  Hip flex   3+/5  3-/5*  5/5  Hip abd  4/5  3-/5*  4+/5  Hip ER   3+/5  3-/5*  5/5  Hip IR   4+/5  4+/5  5/5  Knee flex  4/5  4-/5*  5/5  Knee ext  4+/5  4/5  5/5      Balance:   3/25/19 2/26/19  Tandem R post w/ EO: 30"(+)  14"  Tandem L post w/ EO: 30"(+)  30" (+)  SLS R w/ EO:  9"   unable  SLS L w/ EO:   25"  10"    Function: stairs are now reciprocal w/ rail vs non-reciprocal at evaluation  Standing tolerance is unlimited vs limited to 10' at evaluation; sleep is not disturbed by hip discomfort vs mildly disturbed at evaluation  Pt is able to perform partial squat vs unable to squat at evaluation; no diffculty w/ bed mobility and transfers vs min garrett at evaluation  Pt still using raised toilet seat  He feels balance remains a little limited, and does still use A device to don socks R foot  Gait: Pt ambulates w/o assistive device - he has D/C SPC   Mild Trendellenberg pattern    Palpation: TTP lateral hip          Precautions: posterior approach JUANI precautions, htn; B/L knee OA - had stem cell injections    Daily Treatment Diary   Date  Visit # 3/18/19  6 3/22  7 3/25/19  8  RE/FOTO     Manual  10 min 10;     Neuro strech  Sciatic Piriformis Sciatic pirif and femoral      Stick rolling quad  5 min 5'             There Exer 45' 30 min 30'      bridges w/ feet on pball 10x10" 20 x 5 sec Single leg  2x10     Supine alt marching 30x each 2 x 10 High marching  20'x2     Hip abd SLR 2x10 2 x 10 2x10     Clamshells w/ pillow yellow Red 2 x 10      LAQ Red 3x10       WALTER B/L knee flexion 3x10       Alt hip ext        TB hip ER sit Red 3x10  Red 3x10     FSU w/rail 8"  2x10 8 in 2 x 10 8" 2x10 each   no rail     Hip flexor str 1'x2 off edge Manual 30 sec x 2 manual     Tandem w/ Habd Blue  1x15 each       Leg press supine B/L 55#  3x10  60#  3x10     Leg press supine R uni 25#  3x10  3#  3x10 each     SLS 3 way SLR @ griselda  3 5#  1x15 each R/L       Chair to stand w/ OH press 2" airex pad  5#B/L UE 2x10 2 x 10 with SOS 2x10 w/ SOS     Sidestep w/ TB Red 15'x1 R/L  Red 15'x2 R/L     diag walk w/TB Red 15'x2       Qped hip ext 5" 1x10 each  opp UE/LE   5"x10 each     t mill 2 1x8'  2 1x8'     Modalities        CP 10'  10'

## 2019-03-28 ENCOUNTER — APPOINTMENT (OUTPATIENT)
Dept: PHYSICAL THERAPY | Facility: CLINIC | Age: 69
End: 2019-03-28
Payer: COMMERCIAL

## 2019-03-29 ENCOUNTER — OFFICE VISIT (OUTPATIENT)
Dept: PHYSICAL THERAPY | Facility: CLINIC | Age: 69
End: 2019-03-29
Payer: COMMERCIAL

## 2019-03-29 DIAGNOSIS — Z96.641 PRESENCE OF RIGHT ARTIFICIAL HIP JOINT: ICD-10-CM

## 2019-03-29 DIAGNOSIS — M25.551 RIGHT HIP PAIN: Primary | ICD-10-CM

## 2019-03-29 PROCEDURE — 97140 MANUAL THERAPY 1/> REGIONS: CPT

## 2019-03-29 PROCEDURE — 97110 THERAPEUTIC EXERCISES: CPT

## 2019-03-29 NOTE — PROGRESS NOTES
Daily Note     Today's date: 3/29/2019  Patient name: Raúl Mc  :   MRN: [id number]  Referring provider: Katelynn Cooper MD  Dx:   Encounter Diagnosis     ICD-10-CM    1  Right hip pain M25 551    2  Presence of right artificial hip joint Z96 641        Start Time: 1100  Stop Time: 1157  Total time in clinic (min): 57 minutes    Subjective: Patient reports that he saw his MD the other day who stated that he no longer has restrictions         Objective: See treatment diary below      Precautions: posterior approach JUANI precautions, htn; B/L knee OA - had stem cell injections    Daily Treatment Diary   Date  Visit # 3/18/19  6 3/22  7 3/25/19  8  RE/FOTO 3/29/19    Manual  10 min 10; 10'    Neuro strech  Sciatic Piriformis Sciatic pirif and femoral  Sciatic pirif and femoral     Stick rolling quad  5 min 5' 5'            There Exer 45' 30 min 30'      bridges w/ feet on pball 10x10" 20 x 5 sec Single leg  2x10 Single leg  2x10    Supine alt marching 30x each 2 x 10 High marching  20'x2     Hip abd SLR 2x10 2 x 10 2x10 2 x 10  1 #    Clamshells w/ pillow yellow Red 2 x 10  Red 2  x10     LAQ Red 3x10       WALTER B/L knee flexion 3x10       Alt hip ext        TB hip ER sit Red 3x10  Red 3x10 Red 23 x 10     FSU w/rail 8"  2x10 8 in 2 x 10 8" 2x10 each   no rail 8" 2x10 each   no rail    Hip flexor str 1'x2 off edge Manual 30 sec x 2 manual manual    Tandem w/ Habd Blue  1x15 each       Leg press supine B/L 55#  3x10  60#  3x10 60#  3x10    Leg press supine R uni 25#  3x10  3#  3x10 each 3#  3x10 each    SLS 3 way SLR @ griselda  3 5#  1x15 each R/L       Chair to stand w/ OH press 2" airex pad  5#B/L UE 2x10 2 x 10 with SOS 2x10 w/ SOS 2  X 10  5#     Sidestep w/ TB Red 15'x1 R/L  Red 15'x2 R/L Red 15'x2 R/L    diag walk w/TB Red 15'x2   Red 15'x2 R/L    Qped hip ext 5" 1x10 each  opp UE/LE   5"x10 each opp UE/LE   5"x10 each    t mill 2 1x8'  2 1x8' 2 1x8'    Modalities        CP 10'  10' 10' Assessment: Tolerated treatment well  He completes strengthening exercises well with no complaints of hip pain  Continues to have mod  Length restrictions of the quadriceps and hS which was addressed with manual tx  Patient would benefit from continued PT in order to continue to progress LE strengthening exercises so he can return to PLOF symptom free  Plan: Continue per plan of care

## 2019-04-05 ENCOUNTER — OFFICE VISIT (OUTPATIENT)
Dept: PHYSICAL THERAPY | Facility: CLINIC | Age: 69
End: 2019-04-05
Payer: COMMERCIAL

## 2019-04-05 DIAGNOSIS — M25.551 RIGHT HIP PAIN: Primary | ICD-10-CM

## 2019-04-05 DIAGNOSIS — Z96.641 PRESENCE OF RIGHT ARTIFICIAL HIP JOINT: ICD-10-CM

## 2019-04-05 PROCEDURE — 97110 THERAPEUTIC EXERCISES: CPT | Performed by: PHYSICAL THERAPIST

## 2019-04-05 PROCEDURE — 97140 MANUAL THERAPY 1/> REGIONS: CPT | Performed by: PHYSICAL THERAPIST

## 2019-04-16 ENCOUNTER — OFFICE VISIT (OUTPATIENT)
Dept: PHYSICAL THERAPY | Facility: CLINIC | Age: 69
End: 2019-04-16
Payer: COMMERCIAL

## 2019-04-16 DIAGNOSIS — Z96.641 PRESENCE OF RIGHT ARTIFICIAL HIP JOINT: ICD-10-CM

## 2019-04-16 DIAGNOSIS — M25.551 RIGHT HIP PAIN: Primary | ICD-10-CM

## 2019-04-16 PROCEDURE — 97110 THERAPEUTIC EXERCISES: CPT | Performed by: PHYSICAL THERAPIST

## 2019-04-18 ENCOUNTER — OFFICE VISIT (OUTPATIENT)
Dept: PHYSICAL THERAPY | Facility: CLINIC | Age: 69
End: 2019-04-18
Payer: COMMERCIAL

## 2019-04-18 DIAGNOSIS — M25.551 RIGHT HIP PAIN: Primary | ICD-10-CM

## 2019-04-18 DIAGNOSIS — Z96.641 PRESENCE OF RIGHT ARTIFICIAL HIP JOINT: ICD-10-CM

## 2019-04-18 PROCEDURE — 97110 THERAPEUTIC EXERCISES: CPT | Performed by: PHYSICAL THERAPIST

## 2019-04-23 ENCOUNTER — APPOINTMENT (OUTPATIENT)
Dept: PHYSICAL THERAPY | Facility: CLINIC | Age: 69
End: 2019-04-23
Payer: COMMERCIAL

## 2021-03-01 DIAGNOSIS — Z23 ENCOUNTER FOR IMMUNIZATION: ICD-10-CM

## 2022-03-21 ENCOUNTER — EVALUATION (OUTPATIENT)
Dept: PHYSICAL THERAPY | Facility: CLINIC | Age: 72
End: 2022-03-21
Payer: COMMERCIAL

## 2022-03-21 DIAGNOSIS — Z96.642 STATUS POST TOTAL REPLACEMENT OF LEFT HIP: Primary | ICD-10-CM

## 2022-03-21 NOTE — PROGRESS NOTES
PT Evaluation     Today's date: 3/21/2022  Patient name: Shannon Carpio  : [birth year]  MRN: [id number]  Referring provider: Key Noble MD  Dx:   Encounter Diagnosis     ICD-10-CM    1  Status post total replacement of left hip  Z96 642                   Assessment  Assessment details: 3/21/2022: Shannon Carpio is a 66 y o  male who presents with pain, decreased strength, decreased ROM, decreased joint mobility, ambulatory dysfunction and limited balance  Due to these impairments, patient has difficulty performing ADL's, recreational activities, engaging in social activities, ambulation, stair negotiation, lifting/carrying, transfers and sleeping  His PLOF includes ambulation w/o AD and negotiation of stairs reciprocally - pt currently using SPC for outings and stairs are non-reciprocally  Standing tolerance estimated at 10 minutes currently  Pain is minimal to moderate    Patient's clinical presentation is consistent with their referring diagnosis of Status post total replacement of left hip  (primary encounter diagnosis) and pt is an excellent candidate for skilled physical therapy  Patient has been educated in weight bearing status, home exercise program which was updated today and plan of care  Patient would benefit from skilled physical therapy services to address their aforementioned functional limitations and progress towards prior level of function and independence with home exercise program      Impairments: abnormal gait, abnormal or restricted ROM, activity intolerance, impaired balance, impaired physical strength, lacks appropriate home exercise program, pain with function, weight-bearing intolerance and poor body mechanics  Understanding of Dx/Px/POC: excellent  Goals  Short Term Goals to be accomplished in 4 weeks: (2022)  STG1: Pt will be I with HEP  STG2: Pt will demo ROM L hip equal to right to allow pt to put on socks/shoes w/o difficulty    STG3: Pt will demo 1/2 MMT strength inc L hip where limited  STG4: Pt will demo ambulation all surfaces w/o AD  Long Term Goals to be accomplished in 12 weeks: (2022)   LTG1: Pt will achieve L hip MMT of 4+/5 or better to allow reciprocal stairs w/ rail w/o difficulty  LTG2: Pt will demo hip strength WNL to allow SLS x 20"  LTG3: Pt will demo improved FOTO score of 69 or better w/ reports of pain 0-2/10 in L hip  LTG4: Pt to report no longer waking during the night w/ pain  LTG 5: Pt to report standing tolerance of 30 min or more not to be limited by hip pain  Plan  Plan details:       Patient would benefit from: PT eval and skilled physical therapy  Planned modality interventions: cryotherapy, thermotherapy: hydrocollator packs and unattended electrical stimulation  Planned therapy interventions: manual therapy, neuromuscular re-education, therapeutic activities, therapeutic exercise, home exercise program, stretching, patient education, postural training, balance/weight bearing training and functional ROM exercises  Frequency: 2-3x/week  Duration in weeks: 12  Plan of Care beginning date: 3/21/2022  Plan of Care expiration date: 2022  Treatment plan discussed with: patient        Subjective Evaluation    History of Present Illness  Date of surgery: 2022  Mechanism of injury: Subjective: Pt reports chronic OA L Hip which lead him to use SPC due to pain and weakness  He failed conservative management and elected for JUANI surgery  He did have some home physical therapy after his surgery, and is now progressing to outpatient PT  Pain  At best pain ratin  At worst pain ratin  Pain location: L groin and anterior thigh  Quality: tight and burning  Relieving factors: rest and ice  Exacerbated by: active leg raise or hip flexion    Progression: improved    Social Support  Steps to enter house: yes (w/rail)  Stairs in house: yes (also has chair lift)     Treatments  Previous treatment: physical therapy  Current treatment: physical therapy  Patient Goals  Patient goals for therapy: decreased pain and increased strength  Patient goal: D/C AD for ambulation; recip stairs; LE dressing w/o AD        Objective     AROM:  R  L     3/21/2022 3/20/2022  Hip ER sit  27  20*  Hip IR sit  47  35*  Hip ABD PROM 30  20  Hip flex sup  80  70*  Prone knee flex 120  105    MMT:    R  L     3/21/2022 3/21/2022  Knee flex  4+/5  4/5  Knee exten  4+/5*knee 4-/5*knee/quad  Hip flexion  5/5  4/5*  Hip ER   4+/5  4-/5hip*  Hip IR   5/5  4+/5*hip  Hip exten  5/5  3+/5*  Ankle DF  5/5  5/5  Ankle PF  5/5  5/5      Balance:   3/21/2022  Tandem R post w/ EO: 30 sec +  Tandem L post  w/ EO: 30 sec +  SLS R w/ EO:  7 sec  SLS L w/ EO:  2 sec (L knee and hip pain)      Function:      3/21/2022 - stairs are non-reciprocal with rail; standing tolerance estimated at 10 minutes; sleep is limitated due to pain; Squatting is limited by Knee pain B/L    Gait:   3/21/2022 - uses SPC outdoors and no AD indoors w/ mildly (+) trendellenberg pattern    TUG:   3/21/2022 - 12 44 seconds no AD  5x sit to stand:  3/21/2022 - 14 35 sec no UE assist; c/o B/L knee pain  Homen's sign:    3/21/2022 - negative    Flexibility:   3/21/2022 - mod garrett hip ER/flexion combo - cannot cross foot onto opposite knee needed for donning socks/shoes; mod garrett L quad/min garrett R quad                  Precautions: History reviewed  No pertinent past medical history  Total hip anterior approach   B/L knee OA  SOC: 3/21/2022  FOTO: 3/21/2022  DOS: 2/28/2022  POC Expiration: 6/13/022  Daily Treatment Log:  Date 3/21/2022       Visit #        Manual                        There Exer        bridges 5"x10       Bent knee fallouts 10"x5       Rev clamshells w/ towel roll        Prone quad stretch w/ SOS 20"x3       Upright leg press        Stand hip ext w/ TB        HEP Issued/reviewed       There Activ                        NMReed        Sidestep w/ TB @ ankles YTB10'x2 R/L       High march walking 15'x2 Alt tandem cone taps        maureen                Modalities                                  Access Code: [email or web address]  URL: [email or web address]  Date: 03/21/2022  Prepared by:  Arlis Pallas    Exercises  · Supine Bridge - 1 x daily - 7 x weekly - 2 sets - 10 reps - 5 hold  · Prone Quadriceps Stretch with Strap - 1 x daily - 7 x weekly - 3 reps - 20 hold  · Bent Knee Fallouts - 1 x daily - 7 x weekly - 5 reps - 20 hold  · Walking March - 1 x daily - 7 x weekly - 2 sets - 10 reps  · Side Stepping with Resistance at Ankles - 1 x daily - 7 x weekly - 2 sets - 10 reps

## 2022-03-21 NOTE — LETTER
2022    MD Jennifer Purdy    Patient: Zenon Sanchez   YOB: 1950   Date of Visit: 3/21/2022     Encounter Diagnosis     ICD-10-CM    1  Status post total replacement of left hip  Z96 [id number]        Dear Dr Rolm Mohs:    Thank you for your recent referral of Zenon Sanchez  Please review the attached evaluation summary from Mason's recent visit  Please verify that you agree with the plan of care by signing the attached order  If you have any questions or concerns, please do not hesitate to call  I sincerely appreciate the opportunity to share in the care of one of your patients and hope to have another opportunity to work with you in the near future  Sincerely,    Silvia Beal, PT      Referring Provider:      I certify that I have read the below Plan of Care and certify the need for these services furnished under this plan of treatment while under my care  MD Jennifer Purdy  Via Fax: [phone]          PT Evaluation     Today's date: 3/21/2022  Patient name: Zenon Sanchez  : [birth year]  MRN: [id number]  Referring provider: Tre Holman MD  Dx:   Encounter Diagnosis     ICD-10-CM    1  Status post total replacement of left hip  [id number] [id number]                   Assessment  Assessment details: 3/21/2022: Zenon Sanchez is a 66 y o  male who presents with pain, decreased strength, decreased ROM, decreased joint mobility, ambulatory dysfunction and limited balance  Due to these impairments, patient has difficulty performing ADL's, recreational activities, engaging in social activities, ambulation, stair negotiation, lifting/carrying, transfers and sleeping  His PLOF includes ambulation w/o AD and negotiation of stairs reciprocally - pt currently using SPC for outings and stairs are non-reciprocally  Standing tolerance estimated at 10 minutes currently  Pain is minimal to moderate    Patient's clinical presentation is consistent with their referring diagnosis of Status post total replacement of left hip  (primary encounter diagnosis) and pt is an excellent candidate for skilled physical therapy  Patient has been educated in weight bearing status, home exercise program which was updated today and plan of care  Patient would benefit from skilled physical therapy services to address their aforementioned functional limitations and progress towards prior level of function and independence with home exercise program      Impairments: abnormal gait, abnormal or restricted ROM, activity intolerance, impaired balance, impaired physical strength, lacks appropriate home exercise program, pain with function, weight-bearing intolerance and poor body mechanics  Understanding of Dx/Px/POC: excellent  Goals  Short Term Goals to be accomplished in 4 weeks: (4/18/2022)  STG1: Pt will be I with HEP  STG2: Pt will demo ROM L hip equal to right to allow pt to put on socks/shoes w/o difficulty  STG3: Pt will demo 1/2 MMT strength inc L hip where limited  STG4: Pt will demo ambulation all surfaces w/o AD  Long Term Goals to be accomplished in 12 weeks: (6/13/2022)   LTG1: Pt will achieve L hip MMT of 4+/5 or better to allow reciprocal stairs w/ rail w/o difficulty  LTG2: Pt will demo hip strength WNL to allow SLS x 20"  LTG3: Pt will demo improved FOTO score of 69 or better w/ reports of pain 0-2/10 in L hip  LTG4: Pt to report no longer waking during the night w/ pain  LTG 5: Pt to report standing tolerance of 30 min or more not to be limited by hip pain          Plan  Plan details:       Patient would benefit from: PT eval and skilled physical therapy  Planned modality interventions: cryotherapy, thermotherapy: hydrocollator packs and unattended electrical stimulation  Planned therapy interventions: manual therapy, neuromuscular re-education, therapeutic activities, therapeutic exercise, home exercise program, stretching, patient education, postural training, balance/weight bearing training and functional ROM exercises  Frequency: 2-3x/week  Duration in weeks: 12  Plan of Care beginning date: 3/21/2022  Plan of Care expiration date: 2022  Treatment plan discussed with: patient        Subjective Evaluation    History of Present Illness  Date of surgery: 2022  Mechanism of injury: Subjective: Pt reports chronic OA L Hip which lead him to use SPC due to pain and weakness  He failed conservative management and elected for JUANI surgery  He did have some home physical therapy after his surgery, and is now progressing to outpatient PT  Pain  At best pain ratin  At worst pain ratin  Pain location: L groin and anterior thigh  Quality: tight and burning  Relieving factors: rest and ice  Exacerbated by: active leg raise or hip flexion    Progression: improved    Social Support  Steps to enter house: yes (w/rail)  Stairs in house: yes (also has chair lift)     Treatments  Previous treatment: physical therapy  Current treatment: physical therapy  Patient Goals  Patient goals for therapy: decreased pain and increased strength  Patient goal: D/C AD for ambulation; recip stairs; LE dressing w/o AD        Objective     AROM:  R  L     3/21/2022 3/20/2022  Hip ER sit  27  20*  Hip IR sit  47  35*  Hip ABD PROM 30  20  Hip flex sup  80  70*  Prone knee flex 120  105    MMT:    R  L     3/21/2022 3/21/2022  Knee flex  4+/5  4/5  Knee exten  4+/5*knee 4-/5*knee/quad  Hip flexion  5/5  4/5*  Hip ER   4+/5  4-/5hip*  Hip IR   5/5  4+/5*hip  Hip exten  5/5  3+/5*  Ankle DF  5/5  5/5  Ankle PF  5/5  5/5      Balance:   3/21/2022  Tandem R post w/ EO: 30 sec +  Tandem L post  w/ EO: 30 sec +  SLS R w/ EO:  7 sec  SLS L w/ EO:  2 sec (L knee and hip pain)      Function:      3/21/2022 - stairs are non-reciprocal with rail; standing tolerance estimated at 10 minutes; sleep is limitated due to pain; Squatting is limited by Knee pain B/L    Gait:   3/21/2022 - uses SPC outdoors and no AD indoors w/ mildly (+) trendellenberg pattern    TUG:   3/21/2022 - 12 44 seconds no AD  5x sit to stand:  3/21/2022 - 14 35 sec no UE assist; c/o B/L knee pain  Homen's sign:    3/21/2022 - negative    Flexibility:   3/21/2022 - mod garrett hip ER/flexion combo - cannot cross foot onto opposite knee needed for donning socks/shoes; mod garrett L quad/min garrett R quad                  Precautions: History reviewed  No pertinent past medical history  Total hip anterior approach  B/L knee OA  SOC: 3/21/2022  FOTO: 3/21/2022  DOS: 2/28/2022  POC Expiration: 6/13/022  Daily Treatment Log:  Date 3/21/2022       Visit #        Manual                        There Exer        bridges 5"x10       Bent knee fallouts 10"x5       Rev clamshells w/ towel roll        Prone quad stretch w/ SOS 20"x3       Upright leg press        Stand hip ext w/ TB        HEP Issued/reviewed       There Activ                        NMReed        Sidestep w/ TB @ ankles YTB10'x2 R/L       High march walking 15'x2       Alt tandem cone taps        clamshells                Modalities                                  Access Code: [email or web address]  URL: [email or web address]  Date: 03/21/2022  Prepared by:  Franny Hernandez    Exercises  · Supine Bridge - 1 x daily - 7 x weekly - 2 sets - 10 reps - 5 hold  · Prone Quadriceps Stretch with Strap - 1 x daily - 7 x weekly - 3 reps - 20 hold  · Bent Knee Fallouts - 1 x daily - 7 x weekly - 5 reps - 20 hold  · Walking March - 1 x daily - 7 x weekly - 2 sets - 10 reps  · Side Stepping with Resistance at Ankles - 1 x daily - 7 x weekly - 2 sets - 10 reps

## 2022-03-23 ENCOUNTER — OFFICE VISIT (OUTPATIENT)
Dept: PHYSICAL THERAPY | Facility: CLINIC | Age: 72
End: 2022-03-23
Payer: COMMERCIAL

## 2022-03-23 DIAGNOSIS — Z96.642 STATUS POST TOTAL REPLACEMENT OF LEFT HIP: Primary | ICD-10-CM

## 2022-03-23 NOTE — PROGRESS NOTES
Daily Note     Today's date: 3/23/2022  Patient name: Nicholas Moctezuma  : [birth year]  MRN: [id number]  Referring provider: Raghu Mayers MD  Dx:   Encounter Diagnosis     ICD-10-CM    1  Status post total replacement of left hip  [id number] [id number]                   Subjective: Pt reports new HEP is more challenging, but has no issues w/ it  Objective: See treatment diary below      Assessment: Tolerated treatment fair and demo antalgic gait after leg press (and c/o L knee pain)    Patient would benefit from continued PT and will hold leg press next visit  HEP reviewed - pt advised he can stop HEP from homecare as we have progressed past it  Plan: Continue per plan of care  Precautions: History reviewed  No pertinent past medical history  Total hip anterior approach  B/L knee OA  SOC: 3/21/2022  FOTO: 3/21/2022  DOS: 2022  POC Expiration:   Daily Treatment Log:  Date 3/21/2022 3/23/2022      Visit #        Manual        Stick rolling L hams  5'              There Exer  20'      B/L HR & TR  1x10 ea      Bent knee fallouts 10"x5 10"x5      Rev clamshells w/ towel roll  2x10      Prone quad stretch w/ SOS 20"x3 20"x4      Supine leg press  50# B/L 2x12 L- knee pain after HOLD     Stand hip ext w/ TB  RTB mid shin 1x10 R/L      Stand alt hip abd w/ TB  rTB mid shin 1x10 r/L      HEP Issued/reviewed       There Activ                        NMReed  20'      Sidestep w/ TB @ ankles YTB10'x2 R/L RTB mid shin 10'x2R/L      High march walking 15'x2 Near rail 3 laps      Alt tandem cone taps        clamshells  2x10      FSU  Not bhavana L LE - ham cramping      Bridging   2x10      Sciatic nerve glide knee ext w/ DF  1x10 R/L                        Access Code: [email or web address]  URL: [email or web address]  Date: 2022  Prepared by:  James Steve    Exercises  · Supine Bridge - 1 x daily - 7 x weekly - 2 sets - 10 reps - 5 hold  · Prone Quadriceps Stretch with Strap - 1 x daily - 7 x weekly - 3 reps - 20 hold  · Bent Knee Fallouts - 1 x daily - 7 x weekly - 5 reps - 20 hold  · Walking March - 1 x daily - 7 x weekly - 2 sets - 10 reps  · Side Stepping with Resistance at Ankles - 1 x daily - 7 x weekly - 2 sets - 10 reps

## 2022-03-28 ENCOUNTER — OFFICE VISIT (OUTPATIENT)
Dept: PHYSICAL THERAPY | Facility: CLINIC | Age: 72
End: 2022-03-28
Payer: COMMERCIAL

## 2022-03-28 DIAGNOSIS — Z96.642 STATUS POST TOTAL REPLACEMENT OF LEFT HIP: Primary | ICD-10-CM

## 2022-03-28 NOTE — PROGRESS NOTES
Daily Note     Today's date: 3/28/2022  Patient name: Shannon Carpio  : [birth year]  MRN: [id number]  Referring provider: Key Noble MD  Dx:   Encounter Diagnosis     ICD-10-CM    1  Status post total replacement of left hip  Z96 642                   Subjective: pt reports that his hip has been feeling really good and has no difficulties with his HEP  His knees have been giving him a lot of discomfort  Objective: See treatment diary below      Assessment: Tolerated treatment well  Pt dem appropriate stepping strategies when LOB occurs during dynamic balance  Challenged with posterolateral hip strengthening  Cont to progress as tolerated  Patient would benefit from continued PT      Plan: Continue per plan of care  Precautions: History reviewed  No pertinent past medical history  Total hip anterior approach   B/L knee OA  SOC: 3/21/2022  FOTO: 3/21/2022  DOS: 2022  POC Expiration:   Daily Treatment Log:  Date 3/21/2022 3/23/2022 3/28/2022     Visit #   3      Manual        Stick rolling L hams  5'              There Exer  20' 20'      Recumbent bike    5'      B/L HR & TR  1x10 ea 20x ea      Bent knee fallouts 10"x5 10"x5 10"x10      Rev clamshells w/ towel roll  2x10 2x10      SL hip abd   1x10      Prone quad set    5"x15      LAQ    5"x10 R/L     Prone quad stretch w/ SOS 20"x3 20"x4 30"x4      Supine leg press  50# B/L 2x12 L- knee pain after HOLD     Stand hip ext w/ TB  RTB mid shin 1x10 R/L RTB mid shin 2x10 R/L     Stand alt hip abd w/ TB  rTB mid shin 1x10 r/L RTB mid shin 2x10 R/L      HEP Issued/reviewed       There Activ                        NMReed  20' 20'      Sidestep w/ TB @ ankles YTB10'x2 R/L RTB mid shin 10'x2R/L RTB mid shin 10'x4R/L      High march walking 15'x2 Near rail 3 laps  2 laps      Tandem amb    CS 10'x3      Alt tandem cone taps   2 cones   1x10 R/L      clamshells  2x10 2x10      FSU  Not bhavana L LE - ham cramping      Bridging   2x10 2x10      Sciatic nerve glide knee ext w/ DF  1x10 R/L 2x10 R/L                       Access Code: [email or web address]  URL: [email or web address]  Date: 03/21/2022  Prepared by:  Alyce Lyons    Exercises  · Supine Bridge - 1 x daily - 7 x weekly - 2 sets - 10 reps - 5 hold  · Prone Quadriceps Stretch with Strap - 1 x daily - 7 x weekly - 3 reps - 20 hold  · Bent Knee Fallouts - 1 x daily - 7 x weekly - 5 reps - 20 hold  · Walking March - 1 x daily - 7 x weekly - 2 sets - 10 reps  · Side Stepping with Resistance at Ankles - 1 x daily - 7 x weekly - 2 sets - 10 reps

## 2022-03-31 ENCOUNTER — TELEPHONE (OUTPATIENT)
Dept: PHYSICAL THERAPY | Facility: CLINIC | Age: 72
End: 2022-03-31

## 2022-03-31 NOTE — TELEPHONE ENCOUNTER
David Jaime s/w pt re: informing him she was misquoted by [address]  and he is out of network for PT here  We canceled his remaining apts  Pt will not be billed for past appts

## 2022-03-31 NOTE — PROGRESS NOTES
3/31/2022: Roberto [place]o found out today she was misquoted by [address] [place] [address]   Pt is out of network  Pt was informed of this, future visits were canceled and he was informed he will not be billed for past visits

## 2025-04-24 NOTE — PROGRESS NOTES
PT Evaluation     Today's date: 2025  Patient name: Mason Stone  : 1950  MRN: [id number]  Referring provider: Lit Gaines MD  Dx:   Encounter Diagnosis     ICD-10-CM    1. S/P total knee replacement, left  Z96.652           Start Time: 1015  Stop Time: 1100  Total time in clinic (min): 45 minutes    Assessment  Impairments: abnormal gait, abnormal or restricted ROM, activity intolerance, impaired physical strength, lacks appropriate home exercise program, pain with function, weight-bearing intolerance, poor posture  and poor body mechanics  Symptom irritability: low    Assessment details: Mason Stone is a 75 y.o. male who presents with R knee pain, decreased strength, and decreased ROM following R TKA that took place on 25. Pt presents w/o significant ROM limitations based on the proximity of his s/x. Pt can achieve 105 deg of AROM KF while achieving 0 deg KE, and -2 deg KE w/SLR. Pt has less pain when ambulating as opposed to prolonged sitting and notes that moving into extension from flexion is his most painful motion at this time. Pt can negotiate stairs w/minimal pain, but not yet reciprocally. He does not use an AD at baseline or when on uneven terrain. Most notable yet expected strength deficits includes KE rated as a painful 3+/5 today. Due to these impairments, patient has difficulty performing ADL's, recreational activities and stair negotiation, specifically descending and reciprocating steps in general. Patient's clinical presentation is consistent with their referring diagnosis of S/P R TKA. Patient has been educated in home exercise program and plan of care. Patient would benefit from skilled physical therapy services to address their aforementioned functional limitations and progress towards prior level of function and independence with home exercise program.     Understanding of Dx/Px/POC: excellent     Prognosis: excellent    Goals  Short Term Goals to be accomplished in 4  weeks:  STG1: Pt will be I with HEP to maximize progress between therapy sessions  STG2: Pt will demo 5-10 deg gain of knee extension  STG3: Pt will demo 1/2 MMT strength in knee extension w/less than 2/10 pain  STG4: Pt will amb community distance without gait deviates due to pain or ambulatory dysfunction     Long Term Goals to be accomplished in 12 weeks:   LTG1: Pt will demo knee strength WNL to return to PLOF of gardening pain free about the R knee  LTG2: Pt will demo knee AROM WNL to minimize gait deviations when on uneven terrain  LTG3: Pt will report less than 2/10 pain following 1 hour of yardwork including mowing the lawn      Plan  Patient would benefit from: PT eval and skilled physical therapy  Planned modality interventions: cryotherapy and thermotherapy: hydrocollator packs    Planned therapy interventions: manual therapy, neuromuscular re-education, self care, therapeutic activities, therapeutic exercise, home exercise program and patient education    Frequency: 2-3x week  Duration in weeks: 12  Plan of Care beginning date: 4/25/2025  Plan of Care expiration date: 7/18/2025  Treatment plan discussed with: patient  Plan details: HEP development, stretching, strengthening, A/AA/PROM, joint mobilizations, posture education, STM/MI as needed to reduce muscle tension, muscle reeducation, PLOC discussed and agreed upon with patient.          Subjective Evaluation    History of Present Illness  Mechanism of injury: surgery  Mechanism of injury: Pt reports to therapy S/P L TKA that took place on 4/7. Pt reports a maximum pain level of 8/10 when rising from from prolonged sitting. He reports a current pain level of 3/10 and notes that he can reach 0/10 w/light ambulation. Pt reports difficulty w/stair negotiation in addition to rising from sitting. Pt ambulates w/o AD at baseline and states that he had 6 PT sessions at home prior to coming to [facility]. Pt has a goal to return to driving pain free as PLOF in  addition to yardwork including gardening. Pt adds that he will be undergoing a L TKA at the end of August this year.  Quality of life: good    Patient Goals  Patient goals for therapy: increased strength, decreased pain, decreased edema, increased motion and return to sport/leisure activities    Pain  Current pain rating: 3  At best pain ratin  At worst pain ratin  Quality: dull ache and sharp  Relieving factors: change in position and ice  Aggravating factors: stair climbing  Progression: improved    Social Support  Steps to enter house: yes  7  Stairs in house: yes   18  Lives in: multiple-level home  Lives with: spouse    Employment status: working  Hand dominance: ambidextrous    Treatments  Previous treatment: physical therapy        Objective    (*=pain)    ROM    Knee AROM: Degrees      R (25) L (25)   Extension(Quad set):  0  0  Extension Lag (SLR):   -2  0  Flexion: (Supine/Prone) 105  127    Knee PROM: Degrees      R (25) L (25)   Extension (Quad set):  0  0  Extension Lag (SLR):   -2  0  Flexion: (Supine/Prone) 110*  130      Strength: MMT  Hip    R (25) L (25)     IR(Seated/Supine)  4/5  5/5  ER(Seated/Supine)  4/5  5/5   Flexion(Seated/Supine) 4/5  5/5  Abduction(S/L)  4/5  5/5  Abduction(GM bias)  4/5  4/5    Knee    R (25) L (25)     Extension(Seated)  3+/5*  4+/5  Flexion(Seated)  4/5  4+/5    Ankle    R (25) L (25)    Dorsiflexion(Seated)  5/5  5/5  Plantarflexion(Seated) 5/5  5/5      Observation:  (25): Pt's incision is currently covered by steri-strips; instructed by MD to leave on until they fall off naturally.     Tenderness/Palpation:  (25): Pt has mild TTP to the medial border of the R patella.      Function:  (25)     Ambulation: Pt ambulates w/mild R sided limp and does not use an AD at baseline.    Squat: Pt requires b/l UE support when rising from/lowering into a chair.      Outcome Measures  TU.84s  (4/25)  5xSTS:18.78s (4/25)       Precautions: No past medical history on file.    DOS 4/7/25  SOC: 4/25/25  FOTO: 4/25/25  POC Expiration: 7/19/25  Last RE: N/a  Daily Treatment Log:  Date 4/24/25       Visit # 1; IE       Auth         Auth exp        Manual                        There Exer 20'       Objective Measures SJ A/PROM:   -Flexion Supine:    -Flexion Prone:    -Extension   Supine:    -Q-lag: A/PROM:   -Flexion Supine:    -Flexion Prone:    -Extension   Supine:    -Q-lag: A/PROM:   -Flexion Supine:    -Flexion Prone:    -Extension   Supine:    -Q-lag: A/PROM:   -Flexion Supine:    -Flexion Prone:    -Extension   Supine:    -Q-lag:   Heel slides 20x w/SOS       Hip extension 20x w/GTB       Hip abduction 20x w/GTB       Patellar mobs 20x manual       TKE 20x w/TR               HEP        There Activ                                                        NMReed                                                Modalities                                HEP:  Access Code: [email or web address]  URL: [email or web address]  Date: 04/25/2025  Prepared by: Jayant Hood    Exercises  - Supine Quadricep Sets  - 1 x daily - 7 x weekly - 1-3 sets - 10 reps  - Supine Heel Slide with Strap  - 1 x daily - 7 x weekly - 1-3 sets - 10 reps - 5-10s hold hold  - Hip Extension with Resistance Loop  - 1 x daily - 7 x weekly - 1-3 sets - 10 reps  - Hip Abduction with Resistance Loop  - 1 x daily - 7 x weekly - 1-3 sets - 10 reps

## 2025-04-25 ENCOUNTER — EVALUATION (OUTPATIENT)
Dept: PHYSICAL THERAPY | Facility: CLINIC | Age: 75
End: 2025-04-25
Payer: COMMERCIAL

## 2025-04-25 DIAGNOSIS — Z96.652 S/P TOTAL KNEE REPLACEMENT, LEFT: Primary | ICD-10-CM

## 2025-04-25 PROCEDURE — 97161 PT EVAL LOW COMPLEX 20 MIN: CPT

## 2025-04-25 NOTE — LETTER
2025    Lit Gaines MD  [address] [facility] [address]    Patient: Mason Stone   YOB: 1950   Date of Visit: 2025     Encounter Diagnosis     ICD-10-CM    1. S/P total knee replacement, left  Z96.652           Dear Dr. Lit Gaines MD:    Thank you for your recent referral of Mason Stone. Please review the attached evaluation summary from Mason's recent visit.     Please verify that you agree with the plan of care by signing the attached order.     If you have any questions or concerns, please do not hesitate to call.     I sincerely appreciate the opportunity to share in the care of one of your patients and hope to have another opportunity to work with you in the near future.       Sincerely,    Jayant Hood, PT      Referring Provider:      I certify that I have read the below Plan of Care and certify the need for these services furnished under this plan of treatment while under my care.                    Lit Gaines MD  [address] [facility] [address]  Via Mail          PT Evaluation     Today's date: 2025  Patient name: Mason Stone  : 1950  MRN: [id number]  Referring provider: Lit Gaines MD  Dx:   Encounter Diagnosis     ICD-10-CM    1. S/P total knee replacement, left  Z96.652           Start Time: 1015  Stop Time: 1100  Total time in clinic (min): 45 minutes    Assessment  Impairments: abnormal gait, abnormal or restricted ROM, activity intolerance, impaired physical strength, lacks appropriate home exercise program, pain with function, weight-bearing intolerance, poor posture  and poor body mechanics  Symptom irritability: low    Assessment details: Mason Stone is a 75 y.o. male who presents with R knee pain, decreased strength, and decreased ROM following R TKA that took place on 25. Pt presents w/o significant ROM limitations based on the proximity of his s/x. Pt can achieve 105 deg of AROM KF while achieving 0 deg KE, and  -2 deg KE w/SLR. Pt has less pain when ambulating as opposed to prolonged sitting and notes that moving into extension from flexion is his most painful motion at this time. Pt can negotiate stairs w/minimal pain, but not yet reciprocally. He does not use an AD at baseline or when on uneven terrain. Most notable yet expected strength deficits includes KE rated as a painful 3+/5 today. Due to these impairments, patient has difficulty performing ADL's, recreational activities and stair negotiation, specifically descending and reciprocating steps in general. Patient's clinical presentation is consistent with their referring diagnosis of S/P R TKA. Patient has been educated in home exercise program and plan of care. Patient would benefit from skilled physical therapy services to address their aforementioned functional limitations and progress towards prior level of function and independence with home exercise program.     Understanding of Dx/Px/POC: excellent     Prognosis: excellent    Goals  Short Term Goals to be accomplished in 4 weeks:  STG1: Pt will be I with HEP to maximize progress between therapy sessions  STG2: Pt will demo 5-10 deg gain of knee extension  STG3: Pt will demo 1/2 MMT strength in knee extension w/less than 2/10 pain  STG4: Pt will amb community distance without gait deviates due to pain or ambulatory dysfunction     Long Term Goals to be accomplished in 12 weeks:   LTG1: Pt will demo knee strength WNL to return to PLOF of gardening pain free about the R knee  LTG2: Pt will demo knee AROM WNL to minimize gait deviations when on uneven terrain  LTG3: Pt will report less than 2/10 pain following 1 hour of yardwork including mowing the lawn      Plan  Patient would benefit from: PT eval and skilled physical therapy  Planned modality interventions: cryotherapy and thermotherapy: hydrocollator packs    Planned therapy interventions: manual therapy, neuromuscular re-education, self care, therapeutic  activities, therapeutic exercise, home exercise program and patient education    Frequency: 2-3x week  Duration in weeks: 12  Plan of Care beginning date: 2025  Plan of Care expiration date: 2025  Treatment plan discussed with: patient  Plan details: HEP development, stretching, strengthening, A/AA/PROM, joint mobilizations, posture education, STM/MI as needed to reduce muscle tension, muscle reeducation, PLOC discussed and agreed upon with patient.          Subjective Evaluation    History of Present Illness  Mechanism of injury: surgery  Mechanism of injury: Pt reports to therapy S/P L TKA that took place on . Pt reports a maximum pain level of 8/10 when rising from from prolonged sitting. He reports a current pain level of 3/10 and notes that he can reach 0/10 w/light ambulation. Pt reports difficulty w/stair negotiation in addition to rising from sitting. Pt ambulates w/o AD at baseline and states that he had 6 PT sessions at home prior to coming to [facility]. Pt has a goal to return to driving pain free as PLOF in addition to yardwork including gardening. Pt adds that he will be undergoing a L TKA at the end of August this year.  Quality of life: good    Patient Goals  Patient goals for therapy: increased strength, decreased pain, decreased edema, increased motion and return to sport/leisure activities    Pain  Current pain rating: 3  At best pain ratin  At worst pain ratin  Quality: dull ache and sharp  Relieving factors: change in position and ice  Aggravating factors: stair climbing  Progression: improved    Social Support  Steps to enter house: yes  7  Stairs in house: yes   18  Lives in: multiple-level home  Lives with: spouse    Employment status: working  Hand dominance: ambidextrous    Treatments  Previous treatment: physical therapy        Objective    (*=pain)    ROM    Knee AROM: Degrees      R (25) L (25)   Extension(Quad set):  0  0  Extension Lag (SLR):    -2  0  Flexion: (Supine/Prone) 105  127    Knee PROM: Degrees      R (25) L (25)   Extension (Quad set):  0  0  Extension Lag (SLR):   -2  0  Flexion: (Supine/Prone) 110*  130      Strength: MMT  Hip    R (25) L (25)     IR(Seated/Supine)  4/5  5/5  ER(Seated/Supine)  4/5  5/5   Flexion(Seated/Supine) 4/5  5/5  Abduction(S/L)  4/5  5/5  Abduction(GM bias)  4/5  4/5    Knee    R (25) L (25)     Extension(Seated)  3+/5*  4+/5  Flexion(Seated)  4/5  4+/5    Ankle    R (25) L (25)    Dorsiflexion(Seated)  5/5  5/5  Plantarflexion(Seated) 5/5  5/5      Observation:  (25): Pt's incision is currently covered by steri-strips; instructed by MD to leave on until they fall off naturally.     Tenderness/Palpation:  (25): Pt has mild TTP to the medial border of the R patella.      Function:  (25)     Ambulation: Pt ambulates w/mild R sided limp and does not use an AD at baseline.    Squat: Pt requires b/l UE support when rising from/lowering into a chair.      Outcome Measures  TU.84s ()  5xSTS:18.78s ()       Precautions: No past medical history on file.    DOS 25  SOC: 25  FOTO: 25  POC Expiration: 25  Last RE: N/a  Daily Treatment Log:  Date 25       Visit # 1; IE       Auth         Auth exp        Manual                        There Exer 20'       Objective Measures SJ A/PROM:   -Flexion Supine:    -Flexion Prone:    -Extension   Supine:    -Q-lag: A/PROM:   -Flexion Supine:    -Flexion Prone:    -Extension   Supine:    -Q-lag: A/PROM:   -Flexion Supine:    -Flexion Prone:    -Extension   Supine:    -Q-lag: A/PROM:   -Flexion Supine:    -Flexion Prone:    -Extension   Supine:    -Q-lag:   Heel slides 20x w/SOS       Hip extension 20x w/GTB       Hip abduction 20x w/GTB       Patellar mobs 20x manual       TKE 20x w/TR               HEP        There Activ                                                        NMReed                                                 Modalities                                HEP:  Access Code: [email or web address]  URL: [email or web address]  Date: 04/25/2025  Prepared by: Jayant Hood    Exercises  - Supine Quadricep Sets  - 1 x daily - 7 x weekly - 1-3 sets - 10 reps  - Supine Heel Slide with Strap  - 1 x daily - 7 x weekly - 1-3 sets - 10 reps - 5-10s hold hold  - Hip Extension with Resistance Loop  - 1 x daily - 7 x weekly - 1-3 sets - 10 reps  - Hip Abduction with Resistance Loop  - 1 x daily - 7 x weekly - 1-3 sets - 10 reps

## 2025-04-25 NOTE — HOME EXERCISE EDUCATION
Program_ID:[id number]   Access Code: [email or web address]  URL: [email or web address]  Date: 04-  Prepared By: Jayant Hood    Program Notes      Exercises      - Supine Quadricep Sets - 1 x daily - 7 x weekly - 1-3 sets - 10 reps      - Supine Heel Slide with Strap - 1 x daily - 7 x weekly - 1-3 sets - 10 reps - 5-10s hold hold      - Hip Extension with Resistance Loop - 1 x daily - 7 x weekly - 1-3 sets - 10 reps      - Hip Abduction with Resistance Loop - 1 x daily - 7 x weekly - 1-3 sets - 10 reps

## 2025-04-28 ENCOUNTER — OFFICE VISIT (OUTPATIENT)
Dept: PHYSICAL THERAPY | Facility: CLINIC | Age: 75
End: 2025-04-28
Attending: ORTHOPAEDIC SURGERY
Payer: COMMERCIAL

## 2025-04-28 DIAGNOSIS — Z96.652 S/P TOTAL KNEE REPLACEMENT, LEFT: Primary | ICD-10-CM

## 2025-04-28 PROCEDURE — 97110 THERAPEUTIC EXERCISES: CPT

## 2025-04-28 PROCEDURE — 97112 NEUROMUSCULAR REEDUCATION: CPT

## 2025-04-28 NOTE — HOME EXERCISE EDUCATION
Program_ID:[id number]   Access Code: [email or web address]  URL: [email or web address]  Date: 04-  Prepared By: Jayant Hood    Program Notes      Exercises      - Supine Quadricep Sets - 1 x daily - 7 x weekly - 1-3 sets - 10 reps      - Supine Heel Slide with Strap - 1 x daily - 7 x weekly - 1-3 sets - 10 reps - 5-10s hold hold      - Hip Extension with Resistance Loop - 1 x daily - 7 x weekly - 1-3 sets - 10 reps      - Hip Abduction with Resistance Loop - 1 x daily - 7 x weekly - 1-3 sets - 10 reps      - Supine Bridge - 1 x daily - 7 x weekly - 1 sets - 10 reps      - Heel Toe Raises with Counter Support - 1 x daily - 7 x weekly - 1 sets - 10 reps

## 2025-04-28 NOTE — PROGRESS NOTES
"Daily Note     Today's date: 2025  Patient name: Mason Stone  : 1950  MRN: [id number]  Referring provider: Lit Gaines MD  Dx:   Encounter Diagnosis     ICD-10-CM    1. S/P total knee replacement, left  Z96.652                      Subjective: Patient reports he gets some pain on knee when he is in a position that loads the ball of the foot or he is pushing off (notices more when knee is bent vs straight). Denies calf pain.     Objective: See treatment diary below      Assessment: Tolerated treatment well with poor tolerance to trial of supine hip flexor stretch. Patient denied any increased pain with other TE. Noted some challenge with STS on 25\" mat however was able to perform. Patient would benefit from continued PT      Plan: Continue per plan of care.      Precautions: No past medical history on file.    DOS 25  SOC: 25  FOTO: 25  POC Expiration: 25  Last RE: N/a  Daily Treatment Log:  Date 25      Visit # 1; IE 2      Auth         Auth exp        Manual  5'       Patellar mobs  EG - inf and sup               There Exer 20' 23'      Objective Measures SJ A/PROM: deg  -Flexion Supine:115     -Flexion Prone: NT    -Extension   Supine:0     -Q-lag:-2 A/PROM:   -Flexion Supine:    -Flexion Prone:    -Extension   Supine:    -Q-lag: A/PROM:   -Flexion Supine:    -Flexion Prone:    -Extension   Supine:    -Q-lag: A/PROM:   -Flexion Supine:    -Flexion Prone:    -Extension   Supine:    -Q-lag:   Heel slides 20x w/SOS 5\" x10 R      Hip extension 20x w/GTB Blue TB 2x10 R/L alt       Hip abduction 20x w/GTB Blue TB 2x10 R/L alt       Patellar mobs 20x manual See above       TKE 20x w/TR W/ LR 5\" x10       Bike for ROM   Recumb full rev 5'      LAQ   3\" x10       Supine hip flexor stretch   Trial, unable to tolerate                       HEP  Updated and discussed       There Activ        FSU        LSU        STS  25\" mat 1x10                               NMReed  10'    "   Christater walks         Side stepping         Bridges   1x10       Hooklying clamshell   GTB 2x10       HR/TR   20x      Modalities                                HEP:  Access Code: [email or web address]  URL: [email or web address]  Date: 04/28/2025  Prepared by: Sona Dumont    Exercises  - Supine Quadricep Sets  - 1 x daily - 7 x weekly - 1-3 sets - 10 reps  - Supine Heel Slide with Strap  - 1 x daily - 7 x weekly - 1-3 sets - 10 reps - 5-10s hold hold  - Hip Extension with Resistance Loop  - 1 x daily - 7 x weekly - 1-3 sets - 10 reps  - Hip Abduction with Resistance Loop  - 1 x daily - 7 x weekly - 1-3 sets - 10 reps  - Supine Bridge  - 1 x daily - 7 x weekly - 1 sets - 10 reps  - Heel Toe Raises with Counter Support  - 1 x daily - 7 x weekly - 1 sets - 10 reps

## 2025-04-30 ENCOUNTER — OFFICE VISIT (OUTPATIENT)
Dept: PHYSICAL THERAPY | Facility: CLINIC | Age: 75
End: 2025-04-30
Payer: COMMERCIAL

## 2025-04-30 DIAGNOSIS — Z96.652 S/P TOTAL KNEE REPLACEMENT, LEFT: Primary | ICD-10-CM

## 2025-04-30 PROCEDURE — 97112 NEUROMUSCULAR REEDUCATION: CPT

## 2025-04-30 PROCEDURE — 97110 THERAPEUTIC EXERCISES: CPT

## 2025-04-30 NOTE — PROGRESS NOTES
"Daily Note     Today's date: 2025  Patient name: Mason Stone  : 1950  MRN: [id number]  Referring provider: Lit Gaines MD  Dx:   Encounter Diagnosis     ICD-10-CM    1. S/P total knee replacement, left  Z96.652                      Subjective: pt reports that he is getting around pretty well and he feels better when he is moving. He does have some trouble after prolonged positions dakotah straightening the knee.       Objective: See treatment diary below      Assessment: Tolerated treatment well. R knee ROM in both supine/prone are progressing well at this time post-op. Additional quad strengthening and stretching today tolerated w/o complaints. Cont to progress global LE strengthening as able. Pt dem mild TTP during patellar mobs. Patient would benefit from continued PT      Plan: Continue per plan of care.      Precautions: No past medical history on file.    DOS 25  SOC: 25  FOTO: 25  POC Expiration: 25  Last RE: N/a  Daily Treatment Log:  Date 25     Visit # 1; IE 2 3       Auth    3/12     Auth exp   25     Manual  5'       Patellar mobs  EG - inf and sup  RB              There Exer 20' 23' 30'      Objective Measures SJ A/PROM: deg  Flexion Supine:115     Flexion Prone: NT    Extension   Supine:0     Q-lag:-2 A/PROM:   Flexion Supine: A 115    Flexion Prone: A 100    Extension   Supine: 0 A/PROM:   Flexion Supine:    Flexion Prone:    Extension   Supine:    Q-lag: A/PROM:   Flexion Supine:    Flexion Prone:    Extension   Supine:    Q-lag:   Heel slides 20x w/SOS 5\" x10 R 10\"x5; 2x w/ sos      Hip extension 20x w/GTB Blue TB 2x10 R/L alt  BTB @ shins 2x10 R/L      Hip abduction 20x w/GTB Blue TB 2x10 R/L alt  BTB @ shins 2x10 R/L     Patellar mobs 20x manual See above       TKE 20x w/TR W/ LR 5\" x10  Std YTB 5\"x10      Bike for ROM   Recumb full rev 5' Recumb 5'      LAQ   3\" x10  3\"x10; 2x      Seated HS curl    RTB 1x10; 2x      Prone quad stretch " "w/ sos    10\"x5      Prone AROM flex         Supine hip flexor stretch   Trial, unable to tolerate                       HEP  Updated and discussed       There Activ        FSU        LSU        STS  25\" mat 1x10                               NMReed  10' 10'      Monster walks         Side stepping         Bridges   1x10  1x10; 2x      Hooklying clamshell   GTB 2x10       HR/TR   20x HR off step 2x10      WB AP/ML    20x ea      Modalities        CP    5' post session                      HEP:  Access Code: [email or web address]  URL: [email or web address]  Date: 04/28/2025  Prepared by: Sona Dumont    Exercises  - Supine Quadricep Sets  - 1 x daily - 7 x weekly - 1-3 sets - 10 reps  - Supine Heel Slide with Strap  - 1 x daily - 7 x weekly - 1-3 sets - 10 reps - 5-10s hold hold  - Hip Extension with Resistance Loop  - 1 x daily - 7 x weekly - 1-3 sets - 10 reps  - Hip Abduction with Resistance Loop  - 1 x daily - 7 x weekly - 1-3 sets - 10 reps  - Supine Bridge  - 1 x daily - 7 x weekly - 1 sets - 10 reps  - Heel Toe Raises with Counter Support  - 1 x daily - 7 x weekly - 1 sets - 10 reps             "

## 2025-05-02 ENCOUNTER — APPOINTMENT (OUTPATIENT)
Dept: PHYSICAL THERAPY | Facility: CLINIC | Age: 75
End: 2025-05-02
Payer: COMMERCIAL

## 2025-05-05 ENCOUNTER — OFFICE VISIT (OUTPATIENT)
Dept: PHYSICAL THERAPY | Facility: CLINIC | Age: 75
End: 2025-05-05
Payer: COMMERCIAL

## 2025-05-05 DIAGNOSIS — Z96.652 S/P TOTAL KNEE REPLACEMENT, LEFT: Primary | ICD-10-CM

## 2025-05-05 PROCEDURE — 97140 MANUAL THERAPY 1/> REGIONS: CPT

## 2025-05-05 PROCEDURE — 97110 THERAPEUTIC EXERCISES: CPT

## 2025-05-05 PROCEDURE — 97112 NEUROMUSCULAR REEDUCATION: CPT

## 2025-05-05 NOTE — PROGRESS NOTES
"Daily Note     Today's date: 2025  Patient name: Mason Stone  : 1950  MRN: [id number]  Referring provider: Lit Gaines MD  Dx:   Encounter Diagnosis     ICD-10-CM    1. S/P total knee replacement, left  Z96.652                      Subjective: Pt reports to therapy citing no current pain while adding that he is still unable to negotiate stairs reciprocally.       Objective: See treatment diary below      Assessment: Tolerated treatment well. Pt had pain w/squats at 22 inches; raised to 25 and non-compliant surface added to LLE to encourage RLE WB t/o compound movements. Plan to update HEP NV. Patient would benefit from continued PT      Plan: Continue per plan of care.      Precautions: No past medical history on file.    DOS 25  SOC: 25  FOTO: 25  POC Expiration: 25  Last RE: N/a  Daily Treatment Log:  Date 25    Visit # 1; IE 2 3   4    Auth    3/12 4/12    Auth exp   25    Manual  5'   5'    Patellar mobs  EG - inf and sup  RB  SJ - inf and sup             There Exer 20' 23' 30'  15'    Objective Measures SJ A/PROM: deg  Flexion Supine:115     Flexion Prone: NT    Extension   Supine:0     Q-lag:-2 A/PROM:   Flexion Supine: A 115    Flexion Prone: A 100    Extension   Supine: 0 A/PROM:   Flexion Supine: A 120    Flexion Prone: 100    Extension   Supine: 0    Q-la A/PROM:   Flexion Supine:    Flexion Prone:    Extension   Supine:    Q-lag:   Heel slides 20x w/SOS 5\" x10 R 10\"x5; 2x w/ sos  10\"x5; 2x w/ sos     Hip extension 20x w/GTB Blue TB 2x10 R/L alt  BTB @ shins 2x10 R/L      Hip abduction 20x w/GTB Blue TB 2x10 R/L alt  BTB @ shins 2x10 R/L     STS/Squat    2x10 w/green foam under L foot to encourage R WB            Patellar mobs 20x manual See above   See above    TKE 20x w/TR W/ LR 5\" x10  Std YTB 5\"x10      Bike for ROM   Recumb full rev 5' Recumb 5'  Recumb 5'     LAQ   3\" x10  3\"x10; 2x  3\"x10; 2x     Seated HS curl    " "RTB 1x10; 2x      Prone quad stretch w/ sos    10\"x5  10x10\"    Prone AROM flex         Supine hip flexor stretch   Trial, unable to tolerate                       HEP  Updated and discussed       There Activ        FSU        LSU        STS  25\" mat 1x10                               NMReed  10' 10'  20'    Monster walks     RTB; 2 laps each     Side stepping     RTB; 2 laps each     Bridges   1x10  1x10; 2x  1x10; 2x     Hooklying clamshell   GTB 2x10       HR/TR   20x HR off step 2x10  HR off step 2x10     WB AP/ML    20x ea  20x ea     Modalities        CP    5' post session                      HEP:  Access Code: [email or web address]  URL: [email or web address]  Date: 04/28/2025  Prepared by: Sona Dumont    Exercises  - Supine Quadricep Sets  - 1 x daily - 7 x weekly - 1-3 sets - 10 reps  - Supine Heel Slide with Strap  - 1 x daily - 7 x weekly - 1-3 sets - 10 reps - 5-10s hold hold  - Hip Extension with Resistance Loop  - 1 x daily - 7 x weekly - 1-3 sets - 10 reps  - Hip Abduction with Resistance Loop  - 1 x daily - 7 x weekly - 1-3 sets - 10 reps  - Supine Bridge  - 1 x daily - 7 x weekly - 1 sets - 10 reps  - Heel Toe Raises with Counter Support  - 1 x daily - 7 x weekly - 1 sets - 10 reps               "

## 2025-05-07 ENCOUNTER — OFFICE VISIT (OUTPATIENT)
Dept: PHYSICAL THERAPY | Facility: CLINIC | Age: 75
End: 2025-05-07
Payer: COMMERCIAL

## 2025-05-07 DIAGNOSIS — Z96.652 S/P TOTAL KNEE REPLACEMENT, LEFT: Primary | ICD-10-CM

## 2025-05-07 PROCEDURE — 97110 THERAPEUTIC EXERCISES: CPT

## 2025-05-07 PROCEDURE — 97112 NEUROMUSCULAR REEDUCATION: CPT

## 2025-05-07 NOTE — PROGRESS NOTES
"Daily Note     Today's date: 2025  Patient name: Mason Stone  : 1950  MRN: [id number]  Referring provider: Lit Gaines MD  Dx:   Encounter Diagnosis     ICD-10-CM    1. S/P total knee replacement, left  Z96.652           Start Time: 1145  Stop Time: 1230  Total time in clinic (min): 45 minutes    Subjective: Pt reports to therapy citing \"not intolerable\" pain of \"3 or 4\" when ascending stairs w/reciprocal pattern but also using RHR. He adds that he attempted a squat from a kitchen chair but it was too low to get up w/o discomfort.       Objective: See treatment diary below      Assessment: Tolerated treatment well. Pt was able to tolerate STS from 24 inches w/o intolerable pain, citing 2/3 pain at this time that went away following performance of this intervention. Plan to progress this NV as tolerated by PT eventually progressing to chair height (17-18 inches). Pt Patient would benefit from continued PT      Plan: Continue per plan of care.      Precautions: No past medical history on file.    DOS 25  SOC: 25  FOTO: 25  POC Expiration: 25  Last RE: N/a  Daily Treatment Log:  Date 25   Visit # 1; IE 2 3   4 5; FOTO   Auth    3/12 4/12 5/12   Auth exp   25   Manual  5'   5' 5'   Patellar mobs  EG - inf and sup  RB  SJ - inf and sup  SJ - inf and sup            There Exer 20' 23' 30'  15' 20'   Objective Measures SJ A/PROM: deg  Flexion Supine:115     Flexion Prone: NT    Extension   Supine:0     Q-lag:-2 A/PROM:   Flexion Supine: A 115    Flexion Prone: A 100    Extension   Supine: 0 A/PROM:   Flexion Supine: A 120    Flexion Prone: 100    Extension   Supine: 0    Q-la A/PROM:   Flexion Supine:  A 120  Flexion Prone: 100    Extension   Supine: 0    Q-la   Heel slides 20x w/SOS 5\" x10 R 10\"x5; 2x w/ sos  10\"x5; 2x w/ sos     Hip extension 20x w/GTB Blue TB 2x10 R/L alt  BTB @ shins 2x10 R/L      Hip abduction 20x " "w/GTB Blue TB 2x10 R/L alt  BTB @ shins 2x10 R/L     STS/Squat    2x10 w/green foam under L foot to encourage R WB 3x10 no foam w/ 24 inch table; minor pain elicited; tolerable per pt           Patellar mobs 20x manual See above   See above See above   TKE 20x w/TR W/ LR 5\" x10  Std YTB 5\"x10   Supine RTB w/the patient assist   Bike for ROM   Recumb full rev 5' Recumb 5'  Recumb 5'  Recumb 5'    LAQ   3\" x10  3\"x10; 2x  3\"x10; 2x  3\"x10; 2x    Seated HS curl    RTB 1x10; 2x   GTB 1x10; 2x    Prone quad stretch w/ sos    10\"x5  10x10\" 10x10\"   Prone AROM flex         Supine hip flexor stretch   Trial, unable to tolerate                       HEP  Updated and discussed       There Activ        FSU        LSU        STS  25\" mat 1x10                               NMReed  10' 10'  20' 15'   Monster walks     RTB; 2 laps each  RTB; 2 laps each    Side stepping     RTB; 2 laps each  RTB; 2 laps each    Bridges   1x10  1x10; 2x  1x10; 2x  1x10; 2x w/RTB   Hooklying clamshell   GTB 2x10       HR/TR   20x HR off step 2x10  HR off step 2x10     WB AP/ML    20x ea  20x ea     Modalities        CP    5' post session                      HEP:  Access Code: [email or web address]  URL: [email or web address]  Date: 04/28/2025  Prepared by: Sona Dumont    Exercises  - Supine Quadricep Sets  - 1 x daily - 7 x weekly - 1-3 sets - 10 reps  - Supine Heel Slide with Strap  - 1 x daily - 7 x weekly - 1-3 sets - 10 reps - 5-10s hold hold  - Hip Extension with Resistance Loop  - 1 x daily - 7 x weekly - 1-3 sets - 10 reps  - Hip Abduction with Resistance Loop  - 1 x daily - 7 x weekly - 1-3 sets - 10 reps  - Supine Bridge  - 1 x daily - 7 x weekly - 1 sets - 10 reps  - Heel Toe Raises with Counter Support  - 1 x daily - 7 x weekly - 1 sets - 10 reps                 "

## 2025-05-09 ENCOUNTER — OFFICE VISIT (OUTPATIENT)
Dept: PHYSICAL THERAPY | Facility: CLINIC | Age: 75
End: 2025-05-09
Attending: ORTHOPAEDIC SURGERY
Payer: COMMERCIAL

## 2025-05-09 DIAGNOSIS — Z96.652 S/P TOTAL KNEE REPLACEMENT, LEFT: Primary | ICD-10-CM

## 2025-05-09 PROCEDURE — 97110 THERAPEUTIC EXERCISES: CPT

## 2025-05-09 PROCEDURE — 97112 NEUROMUSCULAR REEDUCATION: CPT

## 2025-05-09 NOTE — PROGRESS NOTES
"Daily Note     Today's date: 2025  Patient name: Mason Stone  : 1950  MRN: [id number]  Referring provider: Lit Gaines MD  Dx:   Encounter Diagnosis     ICD-10-CM    1. S/P total knee replacement, left  Z96.652                      Subjective: Patient reports he is sore today after doing some work and being on his feet a lot yesterday       Objective: See treatment diary below      Assessment: Tolerated treatment well with some limitations due to soreness. Will progress back to previous resistance next session. No increased soreness noted at end of session. Patient would benefit from continued PT      Plan: Continue per plan of care.      Precautions: No past medical history on file.    DOS 25  SOC: 25  FOTO: 25  POC Expiration: 25  Last RE: N/a  Daily Treatment Log:  Date 2025   Visit # 6  3   4 5; FOTO   Auth  6/12  3/12 4/12 5/12   Auth exp 25   Manual 5'   5' 5'   Patellar mobs EG - inf and sup  RB  SJ - inf and sup  SJ - inf and sup    Scar mobility Mildly hypomobile with TTP on incision       There Exer 20'  30'  15' 20'   Objective Measures A/PROM:   Flexion Supine:  A 122  Flexion Prone: 100    Extension   Supine: 0    Q-la  A/PROM:   Flexion Supine: A 115    Flexion Prone: A 100    Extension   Supine: 0 A/PROM:   Flexion Supine: A 120    Flexion Prone: 100    Extension   Supine: 0    Q-la A/PROM:   Flexion Supine:  A 120  Flexion Prone: 100    Extension   Supine: 0    Q-la   Heel slides 10\"x5; 2x w/ sos  10\"x5; 2x w/ sos  10\"x5; 2x w/ sos     Hip extension 1x10 R/L   BTB @ shins 2x10 R/L      Hip abduction   BTB @ shins 2x10 R/L     STS/Squat    2x10 w/green foam under L foot to encourage R WB 3x10 no foam w/ 24 inch table; minor pain elicited; tolerable per pt   TKE Supine RTB 1x10   Std YTB 5\"x10   Supine RTB w/the patient assist   Bike for ROM  Recumb 5'   Recumb 5'  Recumb 5'  Recumb 5'    LAQ  3\" " "x10; 2x  3\"x10; 2x  3\"x10; 2x  3\"x10; 2x    Seated HS curl  GTB 1x10; 2x   RTB 1x10; 2x   GTB 1x10; 2x    Prone quad stretch w/ sos  10\" x10   10\"x5  10x10\" 10x10\"   Prone AROM flex         Supine hip flexor stretch                         HEP        There Activ        FSU        LSU        STS                                NMReed 10'  10'  20' 15'   Monster walks  2 laps near window sill    RTB; 2 laps each  RTB; 2 laps each    Side stepping  2 laps near window sill    RTB; 2 laps each  RTB; 2 laps each    Bridges    1x10; 2x  1x10; 2x  1x10; 2x w/RTB   Hooklying clamshell  RTB 1x10        HR/TR  HR off step 2x10   HR off step 2x10  HR off step 2x10     WB AP/ML  20x ea  20x ea  20x ea     Modalities        CP    5' post session                      HEP:  Access Code: [email or web address]  URL: [email or web address]  Date: 04/28/2025  Prepared by: Sona Dumont    Exercises  - Supine Quadricep Sets  - 1 x daily - 7 x weekly - 1-3 sets - 10 reps  - Supine Heel Slide with Strap  - 1 x daily - 7 x weekly - 1-3 sets - 10 reps - 5-10s hold hold  - Hip Extension with Resistance Loop  - 1 x daily - 7 x weekly - 1-3 sets - 10 reps  - Hip Abduction with Resistance Loop  - 1 x daily - 7 x weekly - 1-3 sets - 10 reps  - Supine Bridge  - 1 x daily - 7 x weekly - 1 sets - 10 reps  - Heel Toe Raises with Counter Support  - 1 x daily - 7 x weekly - 1 sets - 10 reps                 "

## 2025-05-12 ENCOUNTER — OFFICE VISIT (OUTPATIENT)
Dept: PHYSICAL THERAPY | Facility: CLINIC | Age: 75
End: 2025-05-12
Payer: COMMERCIAL

## 2025-05-12 DIAGNOSIS — Z96.652 S/P TOTAL KNEE REPLACEMENT, LEFT: Primary | ICD-10-CM

## 2025-05-12 PROCEDURE — 97110 THERAPEUTIC EXERCISES: CPT

## 2025-05-12 PROCEDURE — 97112 NEUROMUSCULAR REEDUCATION: CPT

## 2025-05-12 NOTE — PROGRESS NOTES
"Daily Note     Today's date: 2025  Patient name: Mason Stone  : 1950  MRN: [id number]  Referring provider: Lit Gaines MD  Dx:   Encounter Diagnosis     ICD-10-CM    1. S/P total knee replacement, left  Z96.652                      Subjective: Pt reports to therapy citing 3/10 pain about the m/l knee. He adds that this pain was 7/10 over the weekend while denying acute injury. He adds that he \"took it easy\" over the weekend to address this pain.       Objective: See treatment diary below      Assessment: Tolerated treatment well. He continues to have min-mod pain when moving into active extension from a flexed position with a \"rubbing\" feeling occurring around the L patella. Pt had no difficulty w/LAQs however remains challenged w/ SL balance and strength when practicing stairs. Leg press was performed for the first time w/o c/o of discomfort; plan to re-assess NV. Patient would benefit from continued PT      Plan: Continue per plan of care.      Precautions: No past medical history on file.    DOS 25  SOC: 25  FOTO: 25  POC Expiration: 25  Last RE: N/a  Daily Treatment Log:  Date 2025   Visit # 6 7 3   4 5; FOTO   Auth  6/12 7/12 3/12 4/12 5/12   Auth exp 25   Manual 5'   5' 5'   Patellar mobs EG - inf and sup SJ- inf and sup RB  SJ - inf and sup  SJ - inf and sup    Scar mobility Mildly hypomobile with TTP on incision       There Exer 20' 30' 30'  15' 20'   Objective Measures A/PROM:   Flexion Supine:  A 122  Flexion Prone: 100    Extension   Supine: 0    Q-la A/PROM:   Flexion Supine:  A 122  Flexion Prone: 100    Extension   Supine: 0    Q-la A/PROM:   Flexion Supine: A 115    Flexion Prone: A 100    Extension   Supine: 0 A/PROM:   Flexion Supine: A 120    Flexion Prone: 100    Extension   Supine: 0    Q-la A/PROM:   Flexion Supine:  A 120  Flexion Prone: 100    Extension   Supine: 0    Q-lag: " "0   Heel slides 10\"x5; 2x w/ sos 10\"x5; 2x w/ sos 10\"x5; 2x w/ sos  10\"x5; 2x w/ sos     Hip extension 1x10 R/L   BTB @ shins 2x10 R/L      Hip abduction   BTB @ shins 2x10 R/L     STS/Squat    2x10 w/green foam under L foot to encourage R WB 3x10 no foam w/ 24 inch table; minor pain elicited; tolerable per pt   TKE Supine RTB 1x10  Standing RTB; 2x10  Std YTB 5\"x10   Supine RTB w/the patient assist   Bike for ROM  Recumb 5'  Recumb 5'  Recumb 5'  Recumb 5'  Recumb 5'    LAQ  3\" x10; 2x 3\" x10; 2x w/ 2# 3\"x10; 2x  3\"x10; 2x  3\"x10; 2x    Seated HS curl  GTB 1x10; 2x   RTB 1x10; 2x   GTB 1x10; 2x    Prone quad stretch w/ sos  10\" x10  3x30s; 2x10s to start 10\"x5  10x10\" 10x10\"   Prone AROM flex         Supine hip flexor stretch         Leg press   3x10 w/25#-Trial                      HEP        There Activ        FSU        LSU        STS                                NMReed 10' 10' 10'  20' 15'   Monster walks  2 laps near window sill    RTB; 2 laps each  RTB; 2 laps each    Side stepping  2 laps near window sill    RTB; 2 laps each  RTB; 2 laps each    Bridges   2x10 w/GTB 1x10; 2x  1x10; 2x  1x10; 2x w/RTB   Hooklying clamshell  RTB 1x10  2x10 w/GTB      HR/TR  HR off step 2x10   HR off step 2x10  HR off step 2x10     WB AP/ML  20x ea  20x ea  20x ea     Modalities        CP    5' post session                      HEP:  Access Code: [email or web address]  URL: [email or web address]  Date: 04/28/2025  Prepared by: Sona Dumont    Exercises  - Supine Quadricep Sets  - 1 x daily - 7 x weekly - 1-3 sets - 10 reps  - Supine Heel Slide with Strap  - 1 x daily - 7 x weekly - 1-3 sets - 10 reps - 5-10s hold hold  - Hip Extension with Resistance Loop  - 1 x daily - 7 x weekly - 1-3 sets - 10 reps  - Hip Abduction with Resistance Loop  - 1 x daily - 7 x weekly - 1-3 sets - 10 reps  - Supine Bridge  - 1 x daily - 7 x weekly - 1 sets - 10 reps  - Heel Toe Raises with Counter Support  - 1 x daily - 7 x weekly - 1 sets - " 10 reps

## 2025-05-14 ENCOUNTER — OFFICE VISIT (OUTPATIENT)
Dept: PHYSICAL THERAPY | Facility: CLINIC | Age: 75
End: 2025-05-14
Attending: ORTHOPAEDIC SURGERY
Payer: COMMERCIAL

## 2025-05-14 DIAGNOSIS — Z96.652 S/P TOTAL KNEE REPLACEMENT, LEFT: Primary | ICD-10-CM

## 2025-05-14 PROCEDURE — 97110 THERAPEUTIC EXERCISES: CPT

## 2025-05-14 PROCEDURE — 97112 NEUROMUSCULAR REEDUCATION: CPT

## 2025-05-14 NOTE — PROGRESS NOTES
"Daily Note     Today's date: 2025  Patient name: Mason Stone  : 1950  MRN: [id number]  Referring provider: Lit Gaines MD  Dx:   Encounter Diagnosis     ICD-10-CM    1. S/P total knee replacement, left  Z96.652                      Subjective: Pt reports his knee \"is much better than over the weekend\" Patient does note increased pain at insertion of patellar tendon with trials of 4\" step at home. Edu not to increase step height as the smaller height is painful and to alter reps to avoid large increases in pain (5 reps vs 10 reps)      Objective: See treatment diary below      Assessment: Tolerated treatment well. Noted patellar tendon pain with LAQ in session. Hypomobility in patella is improving however is still limited superiorly. Patient would benefit from continued PT to further improved ROM and quad strength       Plan: Continue per plan of care.      Precautions: No past medical history on file.    DOS 25  SOC: 25  FOTO: 25  POC Expiration: 25  Last RE: N/a  Daily Treatment Log:  Date 2025   Visit # 6 7 8  5; FOTO   Auth     Auth exp 25   Manual 5'    5'   Patellar mobs EG - inf and sup SJ- inf and sup EG- inf and sup  SJ - inf and sup    Scar mobility Mildly hypomobile with TTP on incision       There Exer 20' 30' 30'  20'   Objective Measures A/PROM:   Flexion Supine:  A 122  Flexion Prone: 100    Extension   Supine: 0    Q-la A/PROM:   Flexion Supine:  A 122  Flexion Prone: 100    Extension   Supine: 0    Q-la A/PROM:   Flexion Supine:  A 122  Flexion Prone: 100    Extension   Supine: 0    Q-la  A/PROM:   Flexion Supine:  A 120  Flexion Prone: 100    Extension   Supine: 0    Q-la   Heel slides 10\"x5; 2x w/ sos 10\"x5; 2x w/ sos 10\"x5; 2x w/ sos     Hip extension 1x10 R/L        Hip abduction        STS/Squat     3x10 no foam w/ 24 inch table; minor pain elicited; tolerable per " "pt   TKE Supine RTB 1x10  Standing RTB; 2x10  Supine GTB 1x10   Supine RTB w/the patient assist   Bike for ROM  Recumb 5'  Recumb 5'  Recumb 5'   Recumb 5'    LAQ  3\" x10; 2x 3\" x10; 2x w/ 2# 3\" x10; 2x no weight due to poor tolerance to trial of 2#   3\"x10; 2x    Seated HS curl  GTB 1x10; 2x     GTB 1x10; 2x    Prone quad stretch w/ sos  10\" x10  3x30s; 2x10s to start 3x30s  10x10\"   Prone AROM flex         Supine hip flexor stretch         Leg press   3x10 w/25#-Trial B/L 35# 2x10                      HEP        There Activ        FSU   4\" step 1x5 R      LSU        STS                                NMReed 10' 10' 15'  15'   Monster walks  2 laps near window sill     RTB; 2 laps each    Side stepping  2 laps near window sill     RTB; 2 laps each    Bridges   2x10 w/GTB 2x10 w/GTB  1x10; 2x w/RTB   Hooklying clamshell  RTB 1x10  2x10 w/GTB 2x10 w/GTB     HR/TR  HR off step 2x10   HR off step 2x10      WB AP/ML  20x ea  20x ea     Modalities        CP                         HEP:  Access Code: [email or web address]  URL: [email or web address]  Date: 04/28/2025  Prepared by: Sona Dumont    Exercises  - Supine Quadricep Sets  - 1 x daily - 7 x weekly - 1-3 sets - 10 reps  - Supine Heel Slide with Strap  - 1 x daily - 7 x weekly - 1-3 sets - 10 reps - 5-10s hold hold  - Hip Extension with Resistance Loop  - 1 x daily - 7 x weekly - 1-3 sets - 10 reps  - Hip Abduction with Resistance Loop  - 1 x daily - 7 x weekly - 1-3 sets - 10 reps  - Supine Bridge  - 1 x daily - 7 x weekly - 1 sets - 10 reps  - Heel Toe Raises with Counter Support  - 1 x daily - 7 x weekly - 1 sets - 10 reps                   "

## 2025-05-19 ENCOUNTER — EVALUATION (OUTPATIENT)
Dept: PHYSICAL THERAPY | Facility: CLINIC | Age: 75
End: 2025-05-19
Payer: COMMERCIAL

## 2025-05-19 DIAGNOSIS — Z96.652 S/P TOTAL KNEE REPLACEMENT, LEFT: Primary | ICD-10-CM

## 2025-05-19 PROCEDURE — 97110 THERAPEUTIC EXERCISES: CPT

## 2025-05-19 NOTE — HOME EXERCISE EDUCATION
Program_ID:[id number]   Access Code: [email or web address]  URL: [email or web address]  Date: 05-  Prepared By: Jayant Hood    Program Notes      Exercises      - Supine Quadricep Sets - 1 x daily - 7 x weekly - 1-3 sets - 10 reps      - Supine Heel Slide with Strap - 1 x daily - 7 x weekly - 1-3 sets - 10 reps - 5-10s hold hold      - Hip Extension with Resistance Loop - 1 x daily - 7 x weekly - 1-3 sets - 10 reps      - Hip Abduction with Resistance Loop - 1 x daily - 7 x weekly - 1-3 sets - 10 reps      - Supine Bridge - 1 x daily - 7 x weekly - 1 sets - 10 reps      - Heel Toe Raises with Counter Support - 1 x daily - 7 x weekly - 1 sets - 10 reps      - Active Straight Leg Raise with Quad Set - 1 x daily - 7 x weekly - 2-3 sets - 10 reps      - Bilateral Short Arc Quad Set - 1 x daily - 7 x weekly - 2-3 sets - 10 reps

## 2025-05-19 NOTE — PROGRESS NOTES
PT Evaluation     Today's date: 2025  Patient name: Mason Stone  : 1950  MRN: [id number]  Referring provider: Lit Gaines MD  Dx:   Encounter Diagnosis     ICD-10-CM    1. S/P total knee replacement, left  Z96.652                        Assessment  Impairments: abnormal gait, abnormal or restricted ROM, activity intolerance, impaired physical strength, lacks appropriate home exercise program, pain with function, weight-bearing intolerance, poor posture  and poor body mechanics  Symptom irritability: low    Assessment details: Pt presents to his RE w/significant strength gains while maintaining and improving L knee A/PROM WNL. Pt no longer demonstrates L quad lag w/SLR. Most notable MMTs include a painful 4-/5 w/ L knee flexion though pt has no evident quad lag. Pt was unable to tolerate LAQs w/ 2# citing 7/10 pain. SAQ was then performed w/reduced pain and added to HEP to encourage quad stability. Pt has achieved near symmetry AROM in B/L knee flexion and extension (122 KF L; 125 KF R, 0 KE R/L) though strength deficits prevent reciprocal stair negotiation at this time due to lack of SLS stability. Pt has achieved a moderate level of function due to his improved ROM however is still lacking in L quad stability though lateral hip musculature is increasing. Plan to focus on enhancing quad stability to encourage more typical gait pattern when completing ADLs in addition to promoting reciprocal stair negotiation.  Understanding of Dx/Px/POC: excellent     Prognosis: excellent    Goals  Short Term Goals to be accomplished in 4 weeks:  STG1: Pt will be I with HEP to maximize progress between therapy sessions-MET   STG2: Pt will demo 5-10 deg gain of knee extension-MET   STG3: Pt will demo 1/2 MMT strength in knee extension w/less than 2/10 pain-MET   STG4: Pt will amb community distance without gait deviates due to pain or ambulatory dysfunction-MET      Long Term Goals to be accomplished  "in 12 weeks:   LTG1: Pt will demo knee strength WNL to return to PLOF of gardening pain free about the R knee  LTG2: Pt will demo knee AROM WNL to minimize gait deviations when on uneven terrain  LTG3: Pt will report less than 2/10 pain following 1 hour of yardwork including mowing the lawn      Plan  Patient would benefit from: PT eval and skilled physical therapy  Planned modality interventions: cryotherapy and thermotherapy: hydrocollator packs    Planned therapy interventions: manual therapy, neuromuscular re-education, self care, therapeutic activities, therapeutic exercise, home exercise program and patient education    Frequency: 2-3x week  Duration in weeks: 12  Plan of Care beginning date: 4/25/2025  Plan of Care expiration date: 7/18/2025  Treatment plan discussed with: patient  Plan details: HEP development, stretching, strengthening, A/AA/PROM, joint mobilizations, posture education, STM/MI as needed to reduce muscle tension, muscle reeducation, PLOC discussed and agreed upon with patient.          Subjective Evaluation    History of Present Illness  Mechanism of injury: surgery  Mechanism of injury: Pt reports to therapy citing an increase in functional capacity as of late, w/limited acute pain. He notes that he has experienced \"soreness\" and mild increased edema following prolonged (>1hr) activity including walking and yardwork including lawn mowing w/a push mower. His soreness typically presents about the m/l knee during these activities and pt denies paresthesia. Pt rates his maximum pain level as \"6 or 7\" when rising from prolonged sitting. He notes noting that he most typically experiences 3/10 pain t/o most days. Pt looks to return to increase LE exercise including increased activity/walking tolerance on both flat and uneven terrain pain free as PLOF.   Quality of life: good    Patient Goals  Patient goals for therapy: increased strength, decreased pain, decreased edema, increased motion and " return to sport/leisure activities    Pain  Current pain rating: 3  At best pain ratin  At worst pain ratin  Quality: dull ache and sharp  Relieving factors: change in position and ice  Aggravating factors: stair climbing  Progression: improved    Social Support  Steps to enter house: yes  7  Stairs in house: yes   18  Lives in: multiple-level home  Lives with: spouse    Employment status: working  Hand dominance: ambidextrous    Treatments  Previous treatment: physical therapy        Objective    (*=pain)    ROM    Knee AROM: Degrees      R (25) L (25)   Extension(Quad set):  0  0  Extension Lag (SLR):   0  0  Flexion: (Supine/Prone) 122  127    Knee PROM: Degrees      R (25) L (25)   Extension (Quad set):  0  0  Extension Lag (SLR):   0  0  Flexion: (Supine/Prone) 125  130      Strength: MMT  Hip    R (25) L (25)     IR(Seated/Supine)  4+/5  5/5  ER(Seated/Supine)  4+/5  5/5   Flexion(Seated/Supine) 4/5*  5/5  Abduction(S/L)  4/5  5/5  Abduction(GM bias)  4+/5  4/5    Knee    R (25) L (25)     Extension(Seated)  4-/5*  4+/5  Flexion(Seated)  4+/5  4+/5    Ankle    R (25) L (25)    Dorsiflexion(Seated)  5/5  5/5  Plantarflexion(Seated) 5/5  5/5      Observation:  (25): Pt's incision is no longer covered by steri-strips; appears clean, dry, and intact w/o diffuse ecchymosis.     Tenderness/Palpation:  (25): Pt has mild TTP to the medial border of the R patella-Remains .      Function:  (25)     Ambulation: Pt ambulates w/decreased R sided limp and does not use an AD at baseline.    Squat: Pt requires b/l UE support when rising from/lowering into a chair; can do this much faster though UE support remains at 18 inches.      Outcome Measures  TU.67s ()  5xSTS: 15.04s ()         Precautions: No past medical history on file.    DOS 25  SOC: 25  FOTO: 25  POC Expiration: 25  Last RE:   Daily  "Treatment Log:  Date 2025    Visit # 6 7 8 9;RE 10; FOTO   Auth      Auth exp 25    Manual 5'       Patellar mobs EG - inf and sup SJ- inf and sup EG- inf and sup SJ- inf and sup    Scar mobility Mildly hypomobile with TTP on incision       There Exer 20' 30' 30' 45'    Objective Measures A/PROM:   Flexion Supine:  A 122  Flexion Prone: 100    Extension   Supine: 0    Q-la A/PROM:   Flexion Supine:  A 122  Flexion Prone: 100    Extension   Supine: 0    Q-la A/PROM:   Flexion Supine:  A 122  Flexion Prone: 100    Extension   Supine: 0    Q-la See RE    Heel slides 10\"x5; 2x w/ sos 10\"x5; 2x w/ sos 10\"x5; 2x w/ sos     Hip extension 1x10 R/L        Hip abduction        STS/Squat        TKE Supine RTB 1x10  Standing RTB; 2x10  Supine GTB 1x10  Standing GTB 20x    Bike for ROM  Recumb 5'  Recumb 5'  Recumb 5'      LAQ  3\" x10; 2x 3\" x10; 2x w/ 2# 3\" x10; 2x no weight due to poor tolerance to trial of 2#  2x10 BW; Pain w/2#    SAQ    2x10 B/L    Seated HS curl  GTB 1x10; 2x        Prone quad stretch w/ sos  10\" x10  3x30s; 2x10s to start 3x30s     Prone AROM flex         Supine hip flexor stretch         Leg press   3x10 w/25#-Trial B/L 35# 2x10      SLR    2x10 on L    SL GM hip abduction    2x10 on L            HEP    Updated    There Activ        FSU   4\" step 1x5 R      LSU        STS                                NMReed 10' 10' 15'     Monster walks  2 laps near window sill        Side stepping  2 laps near window sill        Bridges   2x10 w/GTB 2x10 w/GTB     Hooklying clamshell  RTB 1x10  2x10 w/GTB 2x10 w/GTB     HR/TR  HR off step 2x10   HR off step 2x10      WB AP/ML  20x ea  20x ea     Modalities        CP                         Access Code: [email or web address]  URL: [email or web address]  Date: 2025  Prepared by: Jayant Hood    Exercises  - Supine Quadricep Sets  - 1 x daily - 7 x weekly - 1-3 sets - 10 " reps  - Supine Heel Slide with Strap  - 1 x daily - 7 x weekly - 1-3 sets - 10 reps - 5-10s hold hold  - Hip Extension with Resistance Loop  - 1 x daily - 7 x weekly - 1-3 sets - 10 reps  - Hip Abduction with Resistance Loop  - 1 x daily - 7 x weekly - 1-3 sets - 10 reps  - Supine Bridge  - 1 x daily - 7 x weekly - 1 sets - 10 reps  - Heel Toe Raises with Counter Support  - 1 x daily - 7 x weekly - 1 sets - 10 reps  - Active Straight Leg Raise with Quad Set  - 1 x daily - 7 x weekly - 2-3 sets - 10 reps  - Bilateral Short Arc Quad Set  - 1 x daily - 7 x weekly - 2-3 sets - 10 reps

## 2025-05-21 ENCOUNTER — OFFICE VISIT (OUTPATIENT)
Dept: PHYSICAL THERAPY | Facility: CLINIC | Age: 75
End: 2025-05-21
Attending: ORTHOPAEDIC SURGERY
Payer: COMMERCIAL

## 2025-05-21 DIAGNOSIS — Z96.652 S/P TOTAL KNEE REPLACEMENT, LEFT: Primary | ICD-10-CM

## 2025-05-21 PROCEDURE — 97110 THERAPEUTIC EXERCISES: CPT

## 2025-05-21 PROCEDURE — 97112 NEUROMUSCULAR REEDUCATION: CPT

## 2025-05-21 NOTE — PROGRESS NOTES
"Daily Note     Today's date: 2025  Patient name: Mason Stone  : 1950  MRN: [id number]  Referring provider: Lit Gaines MD  Dx:   Encounter Diagnosis     ICD-10-CM    1. S/P total knee replacement, left  Z96.652           Start Time: 1100  Stop Time: 1145  Total time in clinic (min): 45 minutes    Subjective: Pt reports to therapy citing no current pain and citing only minor soreness yesterday evening      Objective: See treatment diary below      Assessment: Tolerated treatment well. Pt demonstrated decreased pain and increased mobility of the L patella w/ objectively  less \"catching\" of the R patella when moving into extension from ER flexion. Pt still challenged w/ step ups >4 inches. Plan to integrate retro walking vs MARSHAL and Sled pull NV. Patient would benefit from continued PT      Plan: Continue per plan of care.      Precautions: No past medical history on file.    DOS 25  SOC: 25  FOTO: 25  POC Expiration: 25  Last RE:   Daily Treatment Log:  Date 2025   Visit # 6 7 8 9;RE 10; FOTO   Auth  6/12 7/12 8/12 9/12 10/12   Auth exp 25   Manual 5'    5'   Patellar mobs EG - inf and sup SJ- inf and sup EG- inf and sup SJ- inf and sup SJ- inf and sup   Scar mobility Mildly hypomobile with TTP on incision       There Exer 20' 30' 30' 45' 30'   Objective Measures A/PROM:   Flexion Supine:  A 122  Flexion Prone: 100    Extension   Supine: 0    Q-la A/PROM:   Flexion Supine:  A 122  Flexion Prone: 100    Extension   Supine: 0    Q-la A/PROM:   Flexion Supine:  A 122  Flexion Prone: 100    Extension   Supine: 0    Q-la See RE    Heel slides 10\"x5; 2x w/ sos 10\"x5; 2x w/ sos 10\"x5; 2x w/ sos     Hip extension 1x10 R/L        Hip abduction        STS/Squat        TKE Supine RTB 1x10  Standing RTB; 2x10  Supine GTB 1x10  Standing GTB 20x Standing GTB 20x   Bike for ROM  Recumb 5'  Recumb 5'  " "Recumb 5'   Recumb 5'    LAQ  3\" x10; 2x 3\" x10; 2x w/ 2# 3\" x10; 2x no weight due to poor tolerance to trial of 2#  2x10 BW; Pain w/2#    SAQ    2x10 B/L 10x w/BW; 2x10 w/1.5# CW   SLR     2x10 R/L   Seated HS curl  GTB 1x10; 2x        Prone quad stretch w/ sos  10\" x10  3x30s; 2x10s to start 3x30s  3x30s   Prone AROM flex         Supine hip flexor stretch      R only; 10x10s   Leg press   3x10 w/25#-Trial B/L 35# 2x10      SLR    2x10 on R 2x10 on R   SL GM hip abduction    2x10 on R            HEP    Updated    There Activ        FSU   4\" step 1x5 R      LSU        STS                                NMReed 10' 10' 15'  10'   Monster walks  2 laps near window sill        Side stepping  2 laps near window sill        Bridges   2x10 w/GTB 2x10 w/GTB 2x10 w/GTB 2x10 w/GTB   Hooklying clamshell  RTB 1x10  2x10 w/GTB 2x10 w/GTB     HR/TR  HR off step 2x10   HR off step 2x10      WB AP/ML  20x ea  20x ea  20x ea   Modalities        CP                         Access Code: [email or web address]  URL: [email or web address]  Date: 05/19/2025  Prepared by: Jayant Hood    Exercises  - Supine Quadricep Sets  - 1 x daily - 7 x weekly - 1-3 sets - 10 reps  - Supine Heel Slide with Strap  - 1 x daily - 7 x weekly - 1-3 sets - 10 reps - 5-10s hold hold  - Hip Extension with Resistance Loop  - 1 x daily - 7 x weekly - 1-3 sets - 10 reps  - Hip Abduction with Resistance Loop  - 1 x daily - 7 x weekly - 1-3 sets - 10 reps  - Supine Bridge  - 1 x daily - 7 x weekly - 1 sets - 10 reps  - Heel Toe Raises with Counter Support  - 1 x daily - 7 x weekly - 1 sets - 10 reps  - Active Straight Leg Raise with Quad Set  - 1 x daily - 7 x weekly - 2-3 sets - 10 reps  - Bilateral Short Arc Quad Set  - 1 x daily - 7 x weekly - 2-3 sets - 10 reps           "

## 2025-05-27 ENCOUNTER — OFFICE VISIT (OUTPATIENT)
Dept: PHYSICAL THERAPY | Facility: CLINIC | Age: 75
End: 2025-05-27
Attending: ORTHOPAEDIC SURGERY
Payer: COMMERCIAL

## 2025-05-27 DIAGNOSIS — Z96.652 S/P TOTAL KNEE REPLACEMENT, LEFT: Primary | ICD-10-CM

## 2025-05-27 PROCEDURE — 97112 NEUROMUSCULAR REEDUCATION: CPT

## 2025-05-27 PROCEDURE — 97110 THERAPEUTIC EXERCISES: CPT

## 2025-05-27 NOTE — HOME EXERCISE EDUCATION
Program_ID:[id number]   Access Code: [email or web address]  URL: [email or web address]  Date: 05-  Prepared By: Jayant Hood    Program Notes      Exercises      - Supine Quadricep Sets - 1 x daily - 7 x weekly - 1-3 sets - 10 reps      - Supine Heel Slide with Strap - 1 x daily - 7 x weekly - 1-3 sets - 10 reps - 5-10s hold hold      - Hip Extension with Resistance Loop - 1 x daily - 7 x weekly - 1-3 sets - 10 reps      - Supine Bridge - 1 x daily - 7 x weekly - 1 sets - 10 reps      - Active Straight Leg Raise with Quad Set - 1 x daily - 7 x weekly - 2-3 sets - 10 reps      - Bilateral Short Arc Quad Set - 1 x daily - 7 x weekly - 2-3 sets - 10 reps      - Prone Quadriceps Stretch with Strap - 1 x daily - 7 x weekly -  sets - 3 reps - 30sec hold      - Hip Abduction with Resistance Loop - 1 x daily - 7 x weekly - 1-3 sets - 10 reps      - Heel Toe Raises with Counter Support - 1 x daily - 7 x weekly - 1 sets - 10 reps

## 2025-05-27 NOTE — HOME EXERCISE EDUCATION
Program_ID:[id number]   Access Code: [email or web address]  URL: [email or web address]  Date: 05-  Prepared By: Jayant Hood    Program Notes      Exercises      - Supine Quadricep Sets - 1 x daily - 7 x weekly - 1-3 sets - 10 reps      - Supine Heel Slide with Strap - 1 x daily - 7 x weekly - 1-3 sets - 10 reps - 5-10s hold hold      - Hip Extension with Resistance Loop - 1 x daily - 7 x weekly - 1-3 sets - 10 reps      - Supine Bridge - 1 x daily - 7 x weekly - 1 sets - 10 reps      - Active Straight Leg Raise with Quad Set - 1 x daily - 7 x weekly - 2-3 sets - 10 reps      - Bilateral Short Arc Quad Set - 1 x daily - 7 x weekly - 2-3 sets - 10 reps      - Prone Quadriceps Stretch with Strap - 1 x daily - 7 x weekly -  sets - 3 reps - 30sec hold      - Hip Abduction with Resistance Loop - 1 x daily - 7 x weekly - 1-3 sets - 10 reps      - Heel Toe Raises with Counter Support - 1 x daily - 7 x weekly - 1 sets - 10 reps

## 2025-05-27 NOTE — PROGRESS NOTES
"Daily Note     Today's date: 2025  Patient name: Mason Stone  : 1950  MRN: [id number]  Referring provider: Lit Gaines MD  Dx:   Encounter Diagnosis     ICD-10-CM    1. S/P total knee replacement, left  Z96.652                        Subjective: Patient reports some soreness entering clinic today       Objective: See treatment diary below      Assessment: Tolerated treatment well. Pt demonstrated increased pain with supine hip flexor stretch trial therefore held. Tolerated prone quad stretch well. HEP updated to include prone quad stretch. Patient would benefit from continued PT      Plan: Continue per plan of care.      Precautions: No past medical history on file.    DOS 25  SOC: 25  FOTO: 25  POC Expiration: 25  Last RE:   Daily Treatment Log:  Date 2025   Visit # 11  8 9;RE 10; FOTO   Auth  11/12  8/12 9/12 10/12   Auth exp   2025   Manual     5'   Patellar mobs   EG- inf and sup SJ- inf and sup SJ- inf and sup   Scar mobility        There Exer 25'  30' 45' 30'   Objective Measures   A/PROM:   Flexion Supine:  A 122  Flexion Prone: 100    Extension   Supine: 0    Q-la See RE    Heel slides 10\" x5 w/ sos   10\"x5; 2x w/ sos     Hip extension 1x10 R        Hip abduction 1x10 R        STS/Squat        TKE Standing GTB 20x  Supine GTB 1x10  Standing GTB 20x Standing GTB 20x   Bike for ROM  Recumb 5'   Recumb 5'   Recumb 5'    LAQ  3\" x10; 2x   3\" x10; 2x no weight due to poor tolerance to trial of 2#  2x10 BW; Pain w/2#    SAQ    2x10 B/L 10x w/BW; 2x10 w/1.5# CW   SLR 2x10 R/L     2x10 R/L   Seated HS curl         Prone quad stretch w/ sos  30\" x3   3x30s  3x30s   Prone AROM flex         Supine hip flexor stretch  Inc pain in lateral knee unable to tolerate     R only; 10x10s   Leg press    B/L 35# 2x10      SLR 2x10 R/L (pain L>R)    2x10 on R 2x10 on R   SL GM hip abduction    2x10 on R            HEP Updated and " "discussed lessening prone quad stretch hold if increased pain   Updated    There Activ        FSU 4\" 1x5 R   4\" step 1x5 R      LSU        STS                                NMReed 15'  15'  10'   Monster walks         Side stepping         Bridges  1x10 GTB; 2x   2x10 w/GTB 2x10 w/GTB 2x10 w/GTB   Hooklying clamshell  1x10 GTB; 2x  2x10 w/GTB     HR/TR  HR off 4\" step 1x10   HR off step 2x10      WB AP/ML    20x ea  20x ea   Marches  1x5 R/L alt        Modalities        CP                         Access Code: [email or web address]  URL: [email or web address]  Date: 05/27/2025  Prepared by: Sona Dumont    Exercises  - Supine Quadricep Sets  - 1 x daily - 7 x weekly - 1-3 sets - 10 reps  - Supine Heel Slide with Strap  - 1 x daily - 7 x weekly - 1-3 sets - 10 reps - 5-10s hold hold  - Hip Extension with Resistance Loop  - 1 x daily - 7 x weekly - 1-3 sets - 10 reps  - Supine Bridge  - 1 x daily - 7 x weekly - 1 sets - 10 reps  - Active Straight Leg Raise with Quad Set  - 1 x daily - 7 x weekly - 2-3 sets - 10 reps  - Bilateral Short Arc Quad Set  - 1 x daily - 7 x weekly - 2-3 sets - 10 reps  - Prone Quadriceps Stretch with Strap  - 1 x daily - 7 x weekly - 3 reps - 30sec hold  - Hip Abduction with Resistance Loop  - 1 x daily - 7 x weekly - 1-3 sets - 10 reps  - Heel Toe Raises with Counter Support  - 1 x daily - 7 x weekly - 1 sets - 10 reps           "

## 2025-05-29 ENCOUNTER — OFFICE VISIT (OUTPATIENT)
Dept: PHYSICAL THERAPY | Facility: CLINIC | Age: 75
End: 2025-05-29
Payer: COMMERCIAL

## 2025-05-29 DIAGNOSIS — Z96.652 S/P TOTAL KNEE REPLACEMENT, LEFT: Primary | ICD-10-CM

## 2025-05-29 PROCEDURE — 97112 NEUROMUSCULAR REEDUCATION: CPT

## 2025-05-29 PROCEDURE — 97110 THERAPEUTIC EXERCISES: CPT

## 2025-05-29 NOTE — PROGRESS NOTES
"Daily Note     Today's date: 2025  Patient name: Mason Stone  : 1950  MRN: [id number]  Referring provider: Lit Gaines MD  Dx:   Encounter Diagnosis     ICD-10-CM    1. S/P total knee replacement, left  Z96.652                      Subjective: Pt reports to therapy citing 3/10 pain when ascending stairs. This pain presents inferior to the L patella       Objective: See treatment diary below      Assessment: Tolerated treatment well. NMES to the L quad was added to LAQs effectively increasing contraction efficacy. Pt found moderate pain relief w/VCs for decreased DF as he can feel 4-5/10 pain inferior to the L patella w/active knee extension. Plan to continue w/NMES to L quad for the foreseeable future. TKE w/TB added to HEP.   Patient would benefit from continued PT      Plan: Continue per plan of care.      Precautions: No past medical history on file.    DOS 25  SOC: 25  FOTO: 25  POC Expiration: 25  Last RE:   Daily Treatment Log:  Date 2025   Visit # 11 12 8 9;RE 10; FOTO   Auth  11/12 12/12 8/12 9/12 10/12   Auth exp   2025   Manual     5'   Patellar mobs  SJ- inf and sup EG- inf and sup SJ- inf and sup SJ- inf and sup   Scar mobility        There Exer 25' 35' 30' 45' 30'   Objective Measures   A/PROM:   Flexion Supine:  A 122  Flexion Prone: 100    Extension   Supine: 0    Q-la See RE    Heel slides 10\" x5 w/ sos   10\"x5; 2x w/ sos     Hip extension 1x10 R        Hip abduction 1x10 R        STS/Squat  20x w/ foam mat under L foot      TKE Standing GTB 20x Standing GTB 20x; 20x w/YPB Supine GTB 1x10  Standing GTB 20x Standing GTB 20x   Bike for ROM  Recumb 5'   Recumb 5'   Recumb 5'    LAQ  3\" x10; 2x  W/NMES 5 min 10s on 10s off 19PPS 3\" x10; 2x no weight due to poor tolerance to trial of 2#  2x10 BW; Pain w/2#    SAQ    2x10 B/L 10x w/BW; 2x10 w/1.5# CW   SLR 2x10 R/L  2x10 R/L    2x10 R/L   Seated HS " "curl         Prone quad stretch w/ sos  30\" x3  3x30s 3x30s  3x30s   Prone AROM flex         Supine hip flexor stretch  Inc pain in lateral knee unable to tolerate     R only; 10x10s   Leg press    B/L 35# 2x10      SLR 2x10 R/L (pain L>R)  2x10 no pain today  2x10 on R 2x10 on R   SL GM hip abduction    2x10 on R            HEP Updated and discussed lessening prone quad stretch hold if increased pain Updated   Updated    There Activ        FSU 4\" 1x5 R   4\" step 1x5 R      LSU        STS                                NMReed 15' 10' 15'  10'   Monster walks         Side stepping         Bridges  1x10 GTB; 2x  1x10 GTB; 2x  2x10 w/GTB 2x10 w/GTB 2x10 w/GTB   Hooklying clamshell  1x10 GTB; 2x 1x10 GTB; 2x  2x10 w/GTB     HR/TR  HR off 4\" step 1x10   HR off step 2x10      WB AP/ML    20x ea  20x ea   Marches  1x5 R/L alt        Modalities        CP                         Access Code: [email or web address]  URL: [email or web address]  Date: 05/27/2025  Prepared by: Sona Dumont    Exercises  - Supine Quadricep Sets  - 1 x daily - 7 x weekly - 1-3 sets - 10 reps  - Supine Heel Slide with Strap  - 1 x daily - 7 x weekly - 1-3 sets - 10 reps - 5-10s hold hold  - Hip Extension with Resistance Loop  - 1 x daily - 7 x weekly - 1-3 sets - 10 reps  - Supine Bridge  - 1 x daily - 7 x weekly - 1 sets - 10 reps  - Active Straight Leg Raise with Quad Set  - 1 x daily - 7 x weekly - 2-3 sets - 10 reps  - Bilateral Short Arc Quad Set  - 1 x daily - 7 x weekly - 2-3 sets - 10 reps  - Prone Quadriceps Stretch with Strap  - 1 x daily - 7 x weekly - 3 reps - 30sec hold  - Hip Abduction with Resistance Loop  - 1 x daily - 7 x weekly - 1-3 sets - 10 reps  - Heel Toe Raises with Counter Support  - 1 x daily - 7 x weekly - 1 sets - 10 reps             "

## 2025-06-02 ENCOUNTER — APPOINTMENT (OUTPATIENT)
Dept: PHYSICAL THERAPY | Facility: CLINIC | Age: 75
End: 2025-06-02
Attending: ORTHOPAEDIC SURGERY
Payer: COMMERCIAL

## 2025-06-02 ENCOUNTER — TELEPHONE (OUTPATIENT)
Dept: PHYSICAL THERAPY | Facility: CLINIC | Age: 75
End: 2025-06-02

## 2025-06-02 NOTE — TELEPHONE ENCOUNTER
Pt called on Saturday to cx his appt for Monday afternoon due to something coming up. He wanted to RS this appt to fri, at this time there are no open appts available. [facility] will reach out to try to RS elsewhere

## 2025-06-04 ENCOUNTER — OFFICE VISIT (OUTPATIENT)
Dept: PHYSICAL THERAPY | Facility: CLINIC | Age: 75
End: 2025-06-04
Attending: ORTHOPAEDIC SURGERY
Payer: COMMERCIAL

## 2025-06-04 DIAGNOSIS — Z96.652 S/P TOTAL KNEE REPLACEMENT, LEFT: Primary | ICD-10-CM

## 2025-06-04 PROCEDURE — 97110 THERAPEUTIC EXERCISES: CPT

## 2025-06-04 PROCEDURE — 97140 MANUAL THERAPY 1/> REGIONS: CPT

## 2025-06-04 PROCEDURE — 97112 NEUROMUSCULAR REEDUCATION: CPT

## 2025-06-04 NOTE — PROGRESS NOTES
"Daily Note     Today's date: 2025  Patient name: Mason Stone  : 1950  MRN: [id number]  Referring provider: Lit Gaines MD  Dx:   Encounter Diagnosis     ICD-10-CM    1. S/P total knee replacement, left  Z96.652           Start Time: 1100  Stop Time: 1145  Total time in clinic (min): 45 minutes    Subjective: Pt reports to therapy citing \"3-4\" maximum pain about the R knee presenting about the patellar tendon insertion. He adds that his pain is more typically around 2-3/10.      Objective: See treatment diary below      Assessment: Tolerated treatment well. Pt continues to experience moderate (3-5/10) pain inferior to the R patella. Pt typically feels this pain when moving from 90 deg of flexion into extension; and from 0-90 deg moving into flexion from extension. IASTM for scar mobility was performed which had min-mod effect on this pain. Pt has a f/u w/ MD this afternoon and returns to clinic . Patient would benefit from continued PT      Plan: Continue per plan of care.      Precautions: No past medical history on file.    DOS 25  SOC: 25  FOTO: 25  POC Expiration: 25  Last RE:   Daily Treatment Log:  Date 2025   Visit # 11 12 13; FOTO 9;RE 10; FOTO   Auth  11/12 12/12 1/12 9/12 10/12   Auth exp   25   Manual   10'  5'   Patellar mobs  SJ- inf and sup SJ- inf and sup SJ- inf and sup SJ- inf and sup   Scar mobility   IASTM-SJ     There Exer 25' 35' 15' 45' 30'   Objective Measures    See RE    Heel slides 10\" x5 w/ sos        Hip extension 1x10 R        Hip abduction 1x10 R        STS/Squat  20x w/ foam mat under L foot      TKE Standing GTB 20x Standing GTB 20x; 20x w/YPB Standing GTB 20x; 20x w/YPB Standing GTB 20x Standing GTB 20x   Bike for ROM  Recumb 5'   Recumb 5'   Recumb 5'    LAQ  3\" x10; 2x  W/NMES 5 min 10s on 10s off 19PPS W/NMES 5 min 10s on 10s off 19PPS 2x10 BW; Pain w/2#    SAQ    2x10 B/L 10x " "w/BW; 2x10 w/1.5# CW   SLR 2x10 R/L  2x10 R/L  2x10 R/L   2x10 R/L   Seated HS curl         Prone quad stretch w/ sos  30\" x3  3x30s   3x30s   Prone AROM flex         Supine hip flexor stretch  Inc pain in lateral knee unable to tolerate     R only; 10x10s   Leg press         SLR 2x10 R/L (pain L>R)  2x10 no pain today  2x10 on R 2x10 on R   SL GM hip abduction    2x10 on R    SL Clamshell   2x10 on R     HEP Updated and discussed lessening prone quad stretch hold if increased pain Updated   Updated    There Activ   5'     FSU 4\" 1x5 R   Single step \"up and over\" 20x w/ R leading      LSU        STS                                NMReed 15' 10' 15'  10'   Monster walks         Side stepping         Bridges  1x10 GTB; 2x  1x10 GTB; 2x  3x10 BlueTB 2x10 w/GTB 2x10 w/GTB   Hooklying clamshell  1x10 GTB; 2x 1x10 GTB; 2x  20x BlueTB SL     HR/TR  HR off 4\" step 1x10        WB AP/ML      20x ea   Marches  1x5 R/L alt   In standing w/CL UE load; 10x   R/L     Modalities        CP                         Access Code: [email or web address]  URL: [email or web address]  Date: 05/27/2025  Prepared by: Sona Dumont    Exercises  - Supine Quadricep Sets  - 1 x daily - 7 x weekly - 1-3 sets - 10 reps  - Supine Heel Slide with Strap  - 1 x daily - 7 x weekly - 1-3 sets - 10 reps - 5-10s hold hold  - Hip Extension with Resistance Loop  - 1 x daily - 7 x weekly - 1-3 sets - 10 reps  - Supine Bridge  - 1 x daily - 7 x weekly - 1 sets - 10 reps  - Active Straight Leg Raise with Quad Set  - 1 x daily - 7 x weekly - 2-3 sets - 10 reps  - Bilateral Short Arc Quad Set  - 1 x daily - 7 x weekly - 2-3 sets - 10 reps  - Prone Quadriceps Stretch with Strap  - 1 x daily - 7 x weekly - 3 reps - 30sec hold  - Hip Abduction with Resistance Loop  - 1 x daily - 7 x weekly - 1-3 sets - 10 reps  - Heel Toe Raises with Counter Support  - 1 x daily - 7 x weekly - 1 sets - 10 reps               "

## 2025-06-09 ENCOUNTER — OFFICE VISIT (OUTPATIENT)
Dept: PHYSICAL THERAPY | Facility: CLINIC | Age: 75
End: 2025-06-09
Attending: ORTHOPAEDIC SURGERY
Payer: COMMERCIAL

## 2025-06-09 DIAGNOSIS — Z96.652 S/P TOTAL KNEE REPLACEMENT, LEFT: Primary | ICD-10-CM

## 2025-06-09 PROCEDURE — 97140 MANUAL THERAPY 1/> REGIONS: CPT

## 2025-06-09 PROCEDURE — 97110 THERAPEUTIC EXERCISES: CPT

## 2025-06-09 PROCEDURE — 97112 NEUROMUSCULAR REEDUCATION: CPT

## 2025-06-09 NOTE — PROGRESS NOTES
"Daily Note     Today's date: 2025  Patient name: Mason Stone  : 1950  MRN: [id number]  Referring provider: Lit Gaines MD  Dx:   Encounter Diagnosis     ICD-10-CM    1. S/P total knee replacement, left  Z96.652                      Subjective: Pt reports to therapy w/mild increased swelling and pain due to increased yardwork over the weekend. Pt adds that he went o his F/U w/his MD and he was \"very pleased\" w/his progress to this point.       Objective: See treatment diary below      Assessment: Tolerated treatment well. Pt found no discomfort when performing SOS squats though does require minor VCs for WB through RLE. Pt has RE NV; plan to update HEP accordingly. Patient would benefit from continued PT      Plan: Continue per plan of care.      Precautions: No past medical history on file.    DOS 25  SOC: 25  FOTO: 25  POC Expiration: 25  Last RE:   Daily Treatment Log:  Date 2025   Visit # 11 12 13; FOTO 14 10; FOTO   Auth  11/12 12/12 1/12 2/12 10/12   Auth exp   25   Manual   10' 10' 5'   Patellar mobs  SJ- inf and sup SJ- inf and sup SJ- inf and sup SJ- inf and sup   Scar mobility   IASTM-SJ IASTM-SJ    There Exer 25' 35' 15' 15' 30'   Objective Measures        Heel slides 10\" x5 w/ sos        Hip extension 1x10 R        Hip abduction 1x10 R        STS/Squat  20x w/ foam mat under L foot  3x10 w/SOS and Blue foam square and GTB at knees; no pain w/this variation    TKE Standing GTB 20x Standing GTB 20x; 20x w/YPB Standing GTB 20x; 20x w/YPB Standing 3x10; 5s Standing GTB 20x   Bike for ROM  Recumb 5'   Recumb 5'  Recumb 5'  Recumb 5'    LAQ  3\" x10; 2x  W/NMES 5 min 10s on 10s off 19PPS W/NMES 5 min 10s on 10s off 19PPS NV    SAQ     10x w/BW; 2x10 w/1.5# CW   SLR 2x10 R/L  2x10 R/L  2x10 R/L  2x10 R/L  2x10 R/L   Seated HS curl         Prone quad stretch w/ sos  30\" x3  3x30s  3x30s 3x30s   Prone AROM flex       " "  Supine hip flexor stretch  Inc pain in lateral knee unable to tolerate     R only; 10x10s   Leg press         SLR 2x10 R/L (pain L>R)  2x10 no pain today   2x10 on R   SL GM hip abduction        SL Clamshell   2x10 on R     HEP Updated and discussed lessening prone quad stretch hold if increased pain Updated       There Activ   5'     FSU 4\" 1x5 R   Single step \"up and over\" 20x w/ R leading      LSU        STS                                NMReed 15' 10' 15' 20' 10'   Monster walks     4 laps GTB at knees    Side stepping     4 laps GTB at knees    Bridges  1x10 GTB; 2x  1x10 GTB; 2x  3x10 BlueTB  2x10 w/GTB   Hooklying clamshell  1x10 GTB; 2x 1x10 GTB; 2x  20x BlueTB SL     HR/TR  HR off 4\" step 1x10        WB AP/ML      20x ea   GM hip drop off foam block    2x10 R/L single blue foam oval    Marches  1x5 R/L alt   In standing w/CL UE load; 10x   R/L In standing w/CL UE load; 10x   R/L    Modalities        CP                         Access Code: [email or web address]  URL: [email or web address]  Date: 05/27/2025  Prepared by: Sona Dumont    Exercises  - Supine Quadricep Sets  - 1 x daily - 7 x weekly - 1-3 sets - 10 reps  - Supine Heel Slide with Strap  - 1 x daily - 7 x weekly - 1-3 sets - 10 reps - 5-10s hold hold  - Hip Extension with Resistance Loop  - 1 x daily - 7 x weekly - 1-3 sets - 10 reps  - Supine Bridge  - 1 x daily - 7 x weekly - 1 sets - 10 reps  - Active Straight Leg Raise with Quad Set  - 1 x daily - 7 x weekly - 2-3 sets - 10 reps  - Bilateral Short Arc Quad Set  - 1 x daily - 7 x weekly - 2-3 sets - 10 reps  - Prone Quadriceps Stretch with Strap  - 1 x daily - 7 x weekly - 3 reps - 30sec hold  - Hip Abduction with Resistance Loop  - 1 x daily - 7 x weekly - 1-3 sets - 10 reps  - Heel Toe Raises with Counter Support  - 1 x daily - 7 x weekly - 1 sets - 10 reps                 "

## 2025-06-11 ENCOUNTER — EVALUATION (OUTPATIENT)
Dept: PHYSICAL THERAPY | Facility: CLINIC | Age: 75
End: 2025-06-11
Attending: ORTHOPAEDIC SURGERY
Payer: COMMERCIAL

## 2025-06-11 DIAGNOSIS — Z96.652 S/P TOTAL KNEE REPLACEMENT, LEFT: Primary | ICD-10-CM

## 2025-06-11 PROCEDURE — 97140 MANUAL THERAPY 1/> REGIONS: CPT

## 2025-06-11 PROCEDURE — 97110 THERAPEUTIC EXERCISES: CPT

## 2025-06-11 NOTE — PROGRESS NOTES
PT Evaluation     Today's date: 2025  Patient name: Mason Stone  : 1950  MRN: [id number]  Referring provider: Lit Gaines MD  Dx:   Encounter Diagnosis     ICD-10-CM    1. S/P total knee replacement, left  Z96.652               Start Time: 1100  Stop Time: 1145  Total time in clinic (min): 45 minutes    Assessment  Impairments: abnormal gait, abnormal or restricted ROM, activity intolerance, impaired physical strength, lacks appropriate home exercise program, pain with function, weight-bearing intolerance, poor posture  and poor body mechanics  Symptom irritability: low    Assessment details: 25:   Pt presents to his RE making moderate strength gains while increasing R knee flexion ROM  within 2 deg of the C/L limb. Pt can also reach terminal extension w/o pain and can maintain this extension t/o a quad set. That being said MMT/break testing of the R quad remains a painful 4/5; the same as last RE. Pt additionally continues to experience pain when moving from appx 60 deg flexion to terminal knee extension in supine. This pain is less per pt than last RE however still consistent in it's frequency of presentation.  During the RE pt demonstrated pain when moving into R hip flexion during MMT that presented just inferiorly to the R knee cap. ANANDA, FADDIR, and Scour testing of the R hip took place and all but FADDIR were positive. Additional angely testing was negative B/L as well. ITB rolling and and IASTM to musculature surrounding the ITB significantly reduced his anterior R knee pain and increased R hip flexion AROM in sitting during retest of MMT. R LAD did not have any effect on pain. Plan to continue release of R ITB in addition to strengthening of lateral hip musculature to reduce R hip and anterior knee pain w/functional activities moving forward.            : RE  Pt presents to his RE w/significant strength gains while maintaining and improving L knee A/PROM WNL. Pt no longer  demonstrates L quad lag w/SLR. Most notable MMTs include a painful 4-/5 w/ L knee flexion though pt has no evident quad lag. Pt was unable to tolerate LAQs w/ 2# citing 7/10 pain. SAQ was then performed w/reduced pain and added to HEP to encourage quad stability. Pt has achieved near symmetry AROM in B/L knee flexion and extension (122 KF L; 125 KF R, 0 KE R/L) though strength deficits prevent reciprocal stair negotiation at this time due to lack of SLS stability. Pt has achieved a moderate level of function due to his improved ROM however is still lacking in L quad stability though lateral hip musculature is increasing. Plan to focus on enhancing quad stability to encourage more typical gait pattern when completing ADLs in addition to promoting reciprocal stair negotiation.  Understanding of Dx/Px/POC: excellent     Prognosis: excellent    Goals  Short Term Goals to be accomplished in 4 weeks:   STG1: Pt will be I with HEP to maximize progress between therapy sessions-MET 5/19  STG2: Pt will demo 5-10 deg gain of knee extension-MET 5/19  STG3: Pt will demo 1/2 MMT strength in knee extension w/less than 2/10 pain-MET 5/19  STG4: Pt will amb community distance without gait deviates due to pain or ambulatory dysfunction-MET 5/19     Long Term Goals to be accomplished in 12 weeks: 7/14/25  LTG1: Pt will demo knee strength WNL to return to PLOF of gardening pain free about the R knee-ONGOING 6/11  LTG2: Pt will demo knee AROM WNL to minimize gait deviations when on uneven terrain-MET 6/11  LTG3: Pt will report less than 2/10 pain following 1 hour of yardwork including mowing the lawn-ONGOING 6/11      Plan  Patient would benefit from: PT eval and skilled physical therapy  Planned modality interventions: cryotherapy and thermotherapy: hydrocollator packs    Planned therapy interventions: manual therapy, neuromuscular re-education, self care, therapeutic activities, therapeutic exercise, home exercise program and patient  "education    Frequency: 2x week  Duration in weeks: 8  Plan of Care beginning date: 2025  Plan of Care expiration date: 2025  Treatment plan discussed with: patient  Plan details: HEP development, stretching, strengthening, A/AA/PROM, joint mobilizations, posture education, STM/MI as needed to reduce muscle tension, muscle reeducation, PLOC discussed and agreed upon with patient.          Subjective Evaluation    History of Present Illness  Mechanism of injury: surgery  Mechanism of injury: RE:   Pt reports to therapy citing current pain rating of \"2 or 3/10\" while noting that his peak pain can reach \"4 or 5/10\". He adds that flat ground ambulation is no longer painful or challenging. Pt notes that ambulation on uneven terrain for >45 minutes can become painful; ie mowing the lawn. He states that stair negotiation >4 inches is difficult to do w/o use of UE support. Pt no longer ambulates w/AD for any type of terrain. Pt looks to continually progress LE strength to make stair negotiation easier in addition to rising from a chair w/o pain and w/o using UE support for standard height chairs (18 inches).    Quality of life: good    Patient Goals  Patient goals for therapy: increased strength, decreased pain, decreased edema, increased motion and return to sport/leisure activities    Pain  Current pain rating: 3  At best pain ratin  At worst pain ratin  Quality: dull ache and sharp  Relieving factors: change in position and ice  Aggravating factors: stair climbing  Progression: improved    Social Support  Steps to enter house: yes  7  Stairs in house: yes   18  Lives in: multiple-level home  Lives with: spouse    Employment status: working  Hand dominance: ambidextrous    Treatments  Previous treatment: physical therapy        Objective    (*=pain)    ROM    Knee AROM: Degrees      R (25) L (25)   Extension(Quad set):  0  0  Extension Lag (SLR):   0  0  Flexion: " "(Supine/Prone) 125  127    Knee PROM: Degrees      R (25) L (25)   Extension (Quad set):  0  0  Extension Lag (SLR):   0  0  Flexion: (Supine/Prone) 125  130      Strength: MMT  Hip    R (25) L (25)     IR(Seated/Supine)  4+/5  5/5  ER(Seated/Supine)  4+/5  5/5   Flexion(Seated/Supine) 4/5*  5/5  Abduction(S/L)  4+/5  5/5  Abduction(GM bias)  4/5  4/5    Knee    R (25) L (25)     Extension(Seated)  4/5*  4+/5  Flexion(Seated)  4+/5  4+/5    Ankle    R (25) L (25)    Dorsiflexion(Seated)  5/5  5/5  Plantarflexion(Seated) 5/5  5/5      Observation:  (25): Pt's incision is no longer covered by steri-strips; appears clean, dry, and intact w/o diffuse ecchymosis.-Remains 25.    Tenderness/Palpation:  (25): Pt has mild TTP to the medial border of the R patella-Remains 25.      Function:  (25)     Ambulation: Pt ambulates w/decreased R sided limp and does not use an AD at baseline.    Squat: Pt requires b/l UE support when rising from/lowering into a chair; can do this much faster though UE support remains at 18 inches.      Outcome Measures  TU.67s ()  5xSTS: 15.04s ()    TU.76s ()  5xSTS: 10.22s ()    Special Testin25  Scour: Positive R  ANANDA: Positive R  FADER: Negative B/L       Precautions: No past medical history on file.    DOS 25  SOC: 25  FOTO: 25  POC Expiration: 25  Last RE: 25  Daily Treatment Log:  Date 2025 525   Visit # 11 12 13; FOTO 14 15   Auth  11/12 12/12 1/12 2/12 3/12   Auth exp   25   Manual   10' 10' 15'   Patellar mobs  SJ- inf and sup SJ- inf and sup SJ- inf and sup SJ- inf and sup   Scar mobility   IASTM-SJ IASTM-SJ IASTM-SJ   ITB rolling/Theragun     SJ   There Exer 25' 35' 15' 15' 30'   Objective Measures     SJ   5xSTS     Performed   TUG     Performed   ITB Rolling Demo     Performed   Heel slides 10\" x5 w/ sos  " "      Hip extension 1x10 R        Hip abduction 1x10 R        STS/Squat  20x w/ foam mat under L foot  3x10 w/SOS and Blue foam square and GTB at knees; no pain w/this variation 20x for form check at plinth 22in   TKE Standing GTB 20x Standing GTB 20x; 20x w/YPB Standing GTB 20x; 20x w/YPB Standing 3x10; 5s w/YPB Standing 3x10; 5s w/YPB   Bike for ROM  Recumb 5'   Recumb 5'  Recumb 5'     LAQ  3\" x10; 2x  W/NMES 5 min 10s on 10s off 19PPS W/NMES 5 min 10s on 10s off 19PPS NV    SAQ        SLR 2x10 R/L  2x10 R/L  2x10 R/L  2x10 R/L     Seated HS curl         Prone quad stretch w/ sos  30\" x3  3x30s  3x30s    Prone AROM flex         Supine hip flexor stretch  Inc pain in lateral knee unable to tolerate        Leg press         SLR 2x10 R/L (pain L>R)  2x10 no pain today      SL GM hip abduction        SL Clamshell   2x10 on R     HEP Updated and discussed lessening prone quad stretch hold if increased pain Updated       There Activ   5'     FSU 4\" 1x5 R   Single step \"up and over\" 20x w/ R leading      LSU        STS                                NMReed 15' 10' 15' 20'    Monster walks     4 laps GTB at knees    Side stepping     4 laps GTB at knees    Bridges  1x10 GTB; 2x  1x10 GTB; 2x  3x10 BlueTB     Hooklying clamshell  1x10 GTB; 2x 1x10 GTB; 2x  20x BlueTB SL     HR/TR  HR off 4\" step 1x10        WB AP/ML         GM hip drop off foam block    2x10 R/L single blue foam oval    Marches  1x5 R/L alt   In standing w/CL UE load; 10x   R/L In standing w/CL UE load; 10x   R/L    Modalities        CP                         Access Code: [email or web address]  URL: [email or web address]  Date: 05/27/2025  Prepared by: Sona Dumont    Exercises  - Supine Quadricep Sets  - 1 x daily - 7 x weekly - 1-3 sets - 10 reps  - Supine Heel Slide with Strap  - 1 x daily - 7 x weekly - 1-3 sets - 10 reps - 5-10s hold hold  - Hip Extension with Resistance Loop  - 1 x daily - 7 x weekly - 1-3 sets - 10 reps  - Supine Bridge  - 1 x " daily - 7 x weekly - 1 sets - 10 reps  - Active Straight Leg Raise with Quad Set  - 1 x daily - 7 x weekly - 2-3 sets - 10 reps  - Bilateral Short Arc Quad Set  - 1 x daily - 7 x weekly - 2-3 sets - 10 reps  - Prone Quadriceps Stretch with Strap  - 1 x daily - 7 x weekly - 3 reps - 30sec hold  - Hip Abduction with Resistance Loop  - 1 x daily - 7 x weekly - 1-3 sets - 10 reps  - Heel Toe Raises with Counter Support  - 1 x daily - 7 x weekly - 1 sets - 10 reps

## 2025-06-11 NOTE — LETTER
2025    Lit Gaines MD  [address] [facility] [address]    Patient: Mason Stone   YOB: 1950   Date of Visit: 2025     Encounter Diagnosis     ICD-10-CM    1. S/P total knee replacement, left  Z96.652           Dear Dr. Lit Gaines MD  Maosn Stone:    Thank you for your recent referral of Mason Stone. Please review the attached evaluation summary from Mason's recent visit.     Please verify that you agree with the plan of care by signing the attached order.     If you have any questions or concerns, please do not hesitate to call.     I sincerely appreciate the opportunity to share in the care of one of your patients and hope to have another opportunity to work with you in the near future.       Sincerely,    Jayant Hood, PT      Referring Provider:      I certify that I have read the below Plan of Care and certify the need for these services furnished under this plan of treatment while under my care.                    Lit Gaines MD  [address] [facility] [address]  Via Mail          PT Evaluation     Today's date: 2025  Patient name: Mason Stone  : 1950  MRN: [id number]  Referring provider: Lit Gaines MD  Dx:   Encounter Diagnosis     ICD-10-CM    1. S/P total knee replacement, left  Z96.652               Start Time: 1100  Stop Time: 1145  Total time in clinic (min): 45 minutes    Assessment  Impairments: abnormal gait, abnormal or restricted ROM, activity intolerance, impaired physical strength, lacks appropriate home exercise program, pain with function, weight-bearing intolerance, poor posture  and poor body mechanics  Symptom irritability: low    Assessment details: 25:   Pt presents to his RE making moderate strength gains while increasing R knee flexion ROM  within 2 deg of the C/L limb. Pt can also reach terminal extension w/o pain and can maintain this extension t/o a quad set. That being said MMT/break testing of the R  quad remains a painful 4/5; the same as last RE. Pt additionally continues to experience pain when moving from appx 60 deg flexion to terminal knee extension in supine. This pain is less per pt than last RE however still consistent in it's frequency of presentation.  During the RE pt demonstrated pain when moving into R hip flexion during MMT that presented just inferiorly to the R knee cap. ANANDA, FADDIR, and Scour testing of the R hip took place and all but FADDIR were positive. Additional angely testing was negative B/L as well. ITB rolling and and IASTM to musculature surrounding the ITB significantly reduced his anterior R knee pain and increased R hip flexion AROM in sitting during retest of MMT. R LAD did not have any effect on pain. Plan to continue release of R ITB in addition to strengthening of lateral hip musculature to reduce R hip and anterior knee pain w/functional activities moving forward.            5/19: RE  Pt presents to his RE w/significant strength gains while maintaining and improving L knee A/PROM WNL. Pt no longer demonstrates L quad lag w/SLR. Most notable MMTs include a painful 4-/5 w/ L knee flexion though pt has no evident quad lag. Pt was unable to tolerate LAQs w/ 2# citing 7/10 pain. SAQ was then performed w/reduced pain and added to HEP to encourage quad stability. Pt has achieved near symmetry AROM in B/L knee flexion and extension (122 KF L; 125 KF R, 0 KE R/L) though strength deficits prevent reciprocal stair negotiation at this time due to lack of SLS stability. Pt has achieved a moderate level of function due to his improved ROM however is still lacking in L quad stability though lateral hip musculature is increasing. Plan to focus on enhancing quad stability to encourage more typical gait pattern when completing ADLs in addition to promoting reciprocal stair negotiation.  Understanding of Dx/Px/POC: excellent     Prognosis: excellent    Goals  Short Term Goals to be  "accomplished in 4 weeks:   STG1: Pt will be I with HEP to maximize progress between therapy sessions-MET 5/19  STG2: Pt will demo 5-10 deg gain of knee extension-MET 5/19  STG3: Pt will demo 1/2 MMT strength in knee extension w/less than 2/10 pain-MET 5/19  STG4: Pt will amb community distance without gait deviates due to pain or ambulatory dysfunction-MET 5/19     Long Term Goals to be accomplished in 12 weeks: 7/14/25  LTG1: Pt will demo knee strength WNL to return to PLOF of gardening pain free about the R knee-ONGOING 6/11  LTG2: Pt will demo knee AROM WNL to minimize gait deviations when on uneven terrain-MET 6/11  LTG3: Pt will report less than 2/10 pain following 1 hour of yardwork including mowing the lawn-ONGOING 6/11      Plan  Patient would benefit from: PT eval and skilled physical therapy  Planned modality interventions: cryotherapy and thermotherapy: hydrocollator packs    Planned therapy interventions: manual therapy, neuromuscular re-education, self care, therapeutic activities, therapeutic exercise, home exercise program and patient education    Frequency: 2x week  Duration in weeks: 8  Plan of Care beginning date: 4/25/2025  Plan of Care expiration date: 9/18/2025  Treatment plan discussed with: patient  Plan details: HEP development, stretching, strengthening, A/AA/PROM, joint mobilizations, posture education, STM/MI as needed to reduce muscle tension, muscle reeducation, PLOC discussed and agreed upon with patient.          Subjective Evaluation    History of Present Illness  Mechanism of injury: surgery  Mechanism of injury: RE: 6/11  Pt reports to therapy citing current pain rating of \"2 or 3/10\" while noting that his peak pain can reach \"4 or 5/10\". He adds that flat ground ambulation is no longer painful or challenging. Pt notes that ambulation on uneven terrain for >45 minutes can become painful; ie mowing the lawn. He states that stair negotiation >4 inches is difficult to do w/o use of UE " support. Pt no longer ambulates w/AD for any type of terrain. Pt looks to continually progress LE strength to make stair negotiation easier in addition to rising from a chair w/o pain and w/o using UE support for standard height chairs (18 inches).    Quality of life: good    Patient Goals  Patient goals for therapy: increased strength, decreased pain, decreased edema, increased motion and return to sport/leisure activities    Pain  Current pain rating: 3  At best pain ratin  At worst pain ratin  Quality: dull ache and sharp  Relieving factors: change in position and ice  Aggravating factors: stair climbing  Progression: improved    Social Support  Steps to enter house: yes  7  Stairs in house: yes   18  Lives in: multiple-level home  Lives with: spouse    Employment status: working  Hand dominance: ambidextrous    Treatments  Previous treatment: physical therapy        Objective    (*=pain)    ROM    Knee AROM: Degrees      R (25) L (25)   Extension(Quad set):  0  0  Extension Lag (SLR):   0  0  Flexion: (Supine/Prone) 125  127    Knee PROM: Degrees      R (25) L (25)   Extension (Quad set):  0  0  Extension Lag (SLR):   0  0  Flexion: (Supine/Prone) 125  130      Strength: MMT  Hip    R (25) L (25)     IR(Seated/Supine)  4+/5  5/5  ER(Seated/Supine)  4+/5  5/5   Flexion(Seated/Supine) 4/5*  5/5  Abduction(S/L)  4+/5  5/5  Abduction(GM bias)  4/5  4/5    Knee    R (25) L (25)     Extension(Seated)  4/5*  4+/5  Flexion(Seated)  4+/5  4+/5    Ankle    R (25) L (25)    Dorsiflexion(Seated)  5/5  5/5  Plantarflexion(Seated) 5/5  5/5      Observation:  (25): Pt's incision is no longer covered by steri-strips; appears clean, dry, and intact w/o diffuse ecchymosis.-Remains 25.    Tenderness/Palpation:  (25): Pt has mild TTP to the medial border of the R patella-Remains 25.      Function:  (25)     Ambulation: Pt ambulates  "w/decreased R sided limp and does not use an AD at baseline.    Squat: Pt requires b/l UE support when rising from/lowering into a chair; can do this much faster though UE support remains at 18 inches.      Outcome Measures  TU.67s ()  5xSTS: 15.04s ()    TU.76s ()  5xSTS: 10.22s ()    Special Testin25  Scour: Positive R  ANANDA: Positive R  FADER: Negative B/L       Precautions: No past medical history on file.    DOS 25  SOC: 25  FOTO: 25  POC Expiration: 25  Last RE: 25  Daily Treatment Log:  Date 2025   Visit # 11 12 13; FOTO 14 15   Auth  11/12 12/12 1/12 2/12 3/12   Auth exp   25   Manual   10' 10' 15'   Patellar mobs  SJ- inf and sup SJ- inf and sup SJ- inf and sup SJ- inf and sup   Scar mobility   IASTM-SJ IASTM-SJ IASTM-SJ   ITB rolling/Theragun     SJ   There Exer 25' 35' 15' 15' 30'   Objective Measures     SJ   5xSTS     Performed   TUG     Performed   ITB Rolling Demo     Performed   Heel slides 10\" x5 w/ sos        Hip extension 1x10 R        Hip abduction 1x10 R        STS/Squat  20x w/ foam mat under L foot  3x10 w/SOS and Blue foam square and GTB at knees; no pain w/this variation 20x for form check at plinth 22in   TKE Standing GTB 20x Standing GTB 20x; 20x w/YPB Standing GTB 20x; 20x w/YPB Standing 3x10; 5s w/YPB Standing 3x10; 5s w/YPB   Bike for ROM  Recumb 5'   Recumb 5'  Recumb 5'     LAQ  3\" x10; 2x  W/NMES 5 min 10s on 10s off 19PPS W/NMES 5 min 10s on 10s off 19PPS NV    SAQ        SLR 2x10 R/L  2x10 R/L  2x10 R/L  2x10 R/L     Seated HS curl         Prone quad stretch w/ sos  30\" x3  3x30s  3x30s    Prone AROM flex         Supine hip flexor stretch  Inc pain in lateral knee unable to tolerate        Leg press         SLR 2x10 R/L (pain L>R)  2x10 no pain today      SL GM hip abduction        SL Clamshell   2x10 on R     HEP Updated and discussed lessening prone quad stretch hold if " "increased pain Updated       There Activ   5'     FSU 4\" 1x5 R   Single step \"up and over\" 20x w/ R leading      LSU        STS                                NMReed 15' 10' 15' 20'    Monster walks     4 laps GTB at knees    Side stepping     4 laps GTB at knees    Bridges  1x10 GTB; 2x  1x10 GTB; 2x  3x10 BlueTB     Hooklying clamshell  1x10 GTB; 2x 1x10 GTB; 2x  20x BlueTB SL     HR/TR  HR off 4\" step 1x10        WB AP/ML         GM hip drop off foam block    2x10 R/L single blue foam oval    Marches  1x5 R/L alt   In standing w/CL UE load; 10x   R/L In standing w/CL UE load; 10x   R/L    Modalities        CP                         Access Code: [email or web address]  URL: [email or web address]  Date: 05/27/2025  Prepared by: Sona Dumont    Exercises  - Supine Quadricep Sets  - 1 x daily - 7 x weekly - 1-3 sets - 10 reps  - Supine Heel Slide with Strap  - 1 x daily - 7 x weekly - 1-3 sets - 10 reps - 5-10s hold hold  - Hip Extension with Resistance Loop  - 1 x daily - 7 x weekly - 1-3 sets - 10 reps  - Supine Bridge  - 1 x daily - 7 x weekly - 1 sets - 10 reps  - Active Straight Leg Raise with Quad Set  - 1 x daily - 7 x weekly - 2-3 sets - 10 reps  - Bilateral Short Arc Quad Set  - 1 x daily - 7 x weekly - 2-3 sets - 10 reps  - Prone Quadriceps Stretch with Strap  - 1 x daily - 7 x weekly - 3 reps - 30sec hold  - Hip Abduction with Resistance Loop  - 1 x daily - 7 x weekly - 1-3 sets - 10 reps  - Heel Toe Raises with Counter Support  - 1 x daily - 7 x weekly - 1 sets - 10 reps                           "

## 2025-06-16 ENCOUNTER — OFFICE VISIT (OUTPATIENT)
Dept: PHYSICAL THERAPY | Facility: CLINIC | Age: 75
End: 2025-06-16
Attending: ORTHOPAEDIC SURGERY
Payer: COMMERCIAL

## 2025-06-16 DIAGNOSIS — Z96.651 S/P TOTAL KNEE REPLACEMENT, RIGHT: Primary | ICD-10-CM

## 2025-06-16 PROCEDURE — 97110 THERAPEUTIC EXERCISES: CPT

## 2025-06-16 PROCEDURE — 97140 MANUAL THERAPY 1/> REGIONS: CPT

## 2025-06-16 PROCEDURE — 97530 THERAPEUTIC ACTIVITIES: CPT

## 2025-06-16 NOTE — PROGRESS NOTES
"Daily Note     Today's date: 2025  Patient name: Mason Stone  : 1950  MRN: [id number]  Referring provider: Lit Gaines MD  Dx:   Encounter Diagnosis     ICD-10-CM    1. S/P total knee replacement, right  Z96.651                      Subjective: pt reports that his knee is doing well w/o new complaints. He feels the massage gun has been helpful and got one from [employer]       Objective: See treatment diary below      Assessment: Tolerated treatment well. Pt cont to dem \"painful arc\" through partial range of LAQ. Pt dem dec pain/stiffness w/ FSU knee hike after manual therapy. Cont to global LE strengthening and manual as needed for pain modulation. Patient would benefit from continued PT      Plan: Continue per plan of care.      Precautions: No past medical history on file.    DOS 25  SOC: 25  FOTO: 25  POC Expiration: 25  Last RE: 25  Daily Treatment Log:  Date 2025   Visit # 16  13; FOTO 14 15   Auth  16/24  13/24 14/24 15/24   Auth exp 2025   Manual 10'   10' 10' 15'   Patellar mobs   SJ- inf and sup SJ- inf and sup SJ- inf and sup   Scar mobility IASTM to lateral R knee   IASTM-SJ IASTM-SJ IASTM-SJ   Distraction w/ mob belt  RB        ITB rolling/Theragun     SJ   There Exer   15' 15' 30'   Objective Measures     SJ   5xSTS     Performed   TUG     Performed   ITB Rolling Demo     Performed   Heel slides        Hip extension        Hip abduction        STS/Squat    3x10 w/SOS and Blue foam square and GTB at knees; no pain w/this variation 20x for form check at plinth 22in   TKE Std w/ RTB 5\"x10 R; 2x   Standing GTB 20x; 20x w/YPB Standing 3x10; 5s w/YPB Standing 3x10; 5s w/YPB   Bike for ROM  Recumb L2x5'   Recumb 5'  Recumb 5'     LAQ  1x10   W/NMES 5 min 10s on 10s off 19PPS NV    SAQ        SLR   2x10 R/L  2x10 R/L     Seated HS curl         Prone quad stretch w/ sos     3x30s    Prone AROM flex         Supine hip " "flexor stretch         Leg press  SL 15# 2x10 R/L         SLR        SL GM hip abduction        SL Clamshell   2x10 on R     HEP        There Activ   5'     FSU W/ knee hike 6\" 1x10 R/L; 2x 1 UE support   Single step \"up and over\" 20x w/ R leading      LSU        STS                                NMReed   15' 20'    Monster walks     4 laps GTB at knees    Side stepping     4 laps GTB at knees    Bridges    3x10 BlueTB     Hooklying clamshell    20x BlueTB SL     HR/TR         WB AP/ML         GM hip drop off foam block    2x10 R/L single blue foam oval    Marches    In standing w/CL UE load; 10x   R/L In standing w/CL UE load; 10x   R/L    Modalities        CP                         Access Code: [email or web address]  URL: [email or web address]  Date: 05/27/2025  Prepared by: Sona Dumont    Exercises  - Supine Quadricep Sets  - 1 x daily - 7 x weekly - 1-3 sets - 10 reps  - Supine Heel Slide with Strap  - 1 x daily - 7 x weekly - 1-3 sets - 10 reps - 5-10s hold hold  - Hip Extension with Resistance Loop  - 1 x daily - 7 x weekly - 1-3 sets - 10 reps  - Supine Bridge  - 1 x daily - 7 x weekly - 1 sets - 10 reps  - Active Straight Leg Raise with Quad Set  - 1 x daily - 7 x weekly - 2-3 sets - 10 reps  - Bilateral Short Arc Quad Set  - 1 x daily - 7 x weekly - 2-3 sets - 10 reps  - Prone Quadriceps Stretch with Strap  - 1 x daily - 7 x weekly - 3 reps - 30sec hold  - Hip Abduction with Resistance Loop  - 1 x daily - 7 x weekly - 1-3 sets - 10 reps  - Heel Toe Raises with Counter Support  - 1 x daily - 7 x weekly - 1 sets - 10 reps           "

## 2025-06-18 ENCOUNTER — OFFICE VISIT (OUTPATIENT)
Dept: PHYSICAL THERAPY | Facility: CLINIC | Age: 75
End: 2025-06-18
Attending: ORTHOPAEDIC SURGERY
Payer: COMMERCIAL

## 2025-06-18 DIAGNOSIS — Z96.651 S/P TOTAL KNEE REPLACEMENT, RIGHT: Primary | ICD-10-CM

## 2025-06-18 DIAGNOSIS — Z96.652 S/P TOTAL KNEE REPLACEMENT, LEFT: ICD-10-CM

## 2025-06-18 PROCEDURE — 97140 MANUAL THERAPY 1/> REGIONS: CPT

## 2025-06-18 PROCEDURE — 97110 THERAPEUTIC EXERCISES: CPT

## 2025-06-18 NOTE — PROGRESS NOTES
"Daily Note     Today's date: 2025  Patient name: Mason Stone  : 1950  MRN: [id number]  Referring provider: Lit Gaines MD  Dx:   Encounter Diagnosis     ICD-10-CM    1. S/P total knee replacement, right  Z96.651       2. S/P total knee replacement, left  Z96.652             Start Time: 1100  Stop Time: 1145  Total time in clinic (min): 45 minutes    Subjective:  Pt reports no new complaints, reports he uses his thera-gun regularly throughout the day which has continued to help knee.       Objective: See treatment diary below      Assessment: Pt continues to demonstrate \"painful arc\" close to TKE, pt notes less pain than last week and symptoms are improving. Pt notes ttp along superior scar, minimal restriction felt. Pt encouraged to continue strengthening despite pain secondary to significant weakness noted in R knee extensors.     Plan: Continue per plan of care.      Precautions: No past medical history on file.    DOS 25  SOC: 25  FOTO: 25  POC Expiration: 25  Last RE: 25  Daily Treatment Log:  Date 25   Visit # 16 17  14 15    17  14/24 15/24   Auth exp 2025   Manual 10'  10'  10' 15'   Patellar mobs    SJ- inf and sup SJ- inf and sup   Scar mobility IASTM to quad & lateral R knee  IASTM to lateral R knee   IASTM-SJ IASTM-SJ   PF & TF mobs  Inf/sup PF      Distraction w/ mob belt  RB        ITB rolling/Theragun     SJ   There Exer  30'  15' 30'   Objective Measures     SJ   5xSTS     Performed   TUG     Performed   ITB Rolling Demo     Performed   Heel slides        Hip extension        Hip abduction        STS/Squat    3x10 w/SOS and Blue foam square and GTB at knees; no pain w/this variation 20x for form check at plinth 22in   TKE Std w/ RTB 5\"x10 R; 2x    Standing 3x10; 5s w/YPB Standing 3x10; 5s w/YPB   Bike for ROM   Recumb L2x5'   Recumb 5'     LAQ   x10  NV    SAQ  x10      SLR  x10  2x10 R/L   " "  Seated HS curl         Prone quad stretch w/ sos   Manual - WG  3x30s    Prone AROM flex         Supine hip flexor stretch         Leg press  SL 15# 2x10 R/L   SL 20# 2x10 R/L        SLR        Incline Stretch  30\"x3      SL GM hip abduction        Heel Raises  x20      SL Clamshell        HEP        There Activ  10'      FSU W/ knee hike 6\" 1x10 R/L; 2x 1 UE support  6\" 2x10      LSU  Side step 4\" x10      STS  X10 high low mat                              NMReed    20'    Monster walks     4 laps GTB at knees    Side stepping     4 laps GTB at knees    Bridges   x20      Hooklying clamshell         HR/TR         WB AP/ML         GM hip drop off foam block    2x10 R/L single blue foam oval    Marches     In standing w/CL UE load; 10x   R/L    Modalities        CP                         Access Code: [email or web address]  URL: [email or web address]  Date: 05/27/2025  Prepared by: Sona Dumont    Exercises  - Supine Quadricep Sets  - 1 x daily - 7 x weekly - 1-3 sets - 10 reps  - Supine Heel Slide with Strap  - 1 x daily - 7 x weekly - 1-3 sets - 10 reps - 5-10s hold hold  - Hip Extension with Resistance Loop  - 1 x daily - 7 x weekly - 1-3 sets - 10 reps  - Supine Bridge  - 1 x daily - 7 x weekly - 1 sets - 10 reps  - Active Straight Leg Raise with Quad Set  - 1 x daily - 7 x weekly - 2-3 sets - 10 reps  - Bilateral Short Arc Quad Set  - 1 x daily - 7 x weekly - 2-3 sets - 10 reps  - Prone Quadriceps Stretch with Strap  - 1 x daily - 7 x weekly - 3 reps - 30sec hold  - Hip Abduction with Resistance Loop  - 1 x daily - 7 x weekly - 1-3 sets - 10 reps  - Heel Toe Raises with Counter Support  - 1 x daily - 7 x weekly - 1 sets - 10 reps           "

## 2025-06-23 ENCOUNTER — OFFICE VISIT (OUTPATIENT)
Dept: PHYSICAL THERAPY | Facility: CLINIC | Age: 75
End: 2025-06-23
Attending: ORTHOPAEDIC SURGERY
Payer: COMMERCIAL

## 2025-06-23 DIAGNOSIS — Z96.652 S/P TOTAL KNEE REPLACEMENT, LEFT: ICD-10-CM

## 2025-06-23 DIAGNOSIS — Z96.651 S/P TOTAL KNEE REPLACEMENT, RIGHT: Primary | ICD-10-CM

## 2025-06-23 PROCEDURE — 97140 MANUAL THERAPY 1/> REGIONS: CPT

## 2025-06-23 PROCEDURE — 97530 THERAPEUTIC ACTIVITIES: CPT

## 2025-06-23 PROCEDURE — 97110 THERAPEUTIC EXERCISES: CPT

## 2025-06-23 NOTE — PROGRESS NOTES
"Daily Note     Today's date: 2025  Patient name: Mason Stone  : 1950  MRN: [id number]  Referring provider: Lit Gaines MD  Dx:   Encounter Diagnosis     ICD-10-CM    1. S/P total knee replacement, right  Z96.651       2. S/P total knee replacement, left  Z96.652                      Subjective: Patient has been trialing reciprocal stairs with railing. Patient reports no \"unusual pain\" only occasional 2-3/10 ache with increases to 4/10 with stair negotiation but does not linger.       Objective: See treatment diary below      Assessment: Tolerated treatment well with some cramping into R HS on 3rd rep of prone quad stretch. Relieved with stick rolling. Patient would benefit from continued PT      Plan: Continue per plan of care.      Precautions: No past medical history on file.    DOS 25  SOC: 25  FOTO: 25  POC Expiration: 25  Last RE: 25  Daily Treatment Log:  Date 25     Visit # 16 17 18     Auth       Auth exp 2025     Manual 10'  10' 10'      Patellar mobs        Scar mobility IASTM to quad & lateral R knee  IASTM to lateral R knee       PF & TF mobs  Inf/sup PF      Distraction w/ mob belt  RB        ITB rolling/Theragun   ITB and HS stick rolling - EG     There Exer  30' 20'     Objective Measures        5xSTS        TUG        Heel slides        Hip extension        Hip abduction        STS/Squat        TKE Std w/ RTB 5\"x10 R; 2x   Std w/ RTB 5\"x10 R; 2x      Bike for ROM   Recumb L2x5'  Recumb L2x5'      LAQ   x10 3\" x10      SAQ  x10      SLR  x10 1x10 R/L ; 2x     Seated HS curl         Prone quad stretch w/ sos   Manual - WG 3x30s     Prone AROM flex         Supine hip flexor stretch         Leg press  SL 15# 2x10 R/L   SL 20# 2x10 R/L   SL 20# 2x10 R/L       SLR        Incline Stretch  30\"x3 30\" x3      SL GM hip abduction        Heel Raises  x20 2x10      SL Clamshell        HEP        There Activ  10' 10'   " "  FSU W/ knee hike 6\" 1x10 R/L; 2x 1 UE support  6\" 2x10 W/ knee hike 6\" 1x10 R/L; 2x 1 UE support      LSU  Side step 4\" x10 4\" step 1x5 R/L      STS  X10 high low mat                              NMReed        Monster walks         Side stepping         Bridges   x20 1x10      Hooklying clamshell         HR/TR         WB AP/ML         GM hip drop off foam block        Marches         Modalities        CP                         Access Code: [email or web address]  URL: [email or web address]  Date: 05/27/2025  Prepared by: Sona Dumont    Exercises  - Supine Quadricep Sets  - 1 x daily - 7 x weekly - 1-3 sets - 10 reps  - Supine Heel Slide with Strap  - 1 x daily - 7 x weekly - 1-3 sets - 10 reps - 5-10s hold hold  - Hip Extension with Resistance Loop  - 1 x daily - 7 x weekly - 1-3 sets - 10 reps  - Supine Bridge  - 1 x daily - 7 x weekly - 1 sets - 10 reps  - Active Straight Leg Raise with Quad Set  - 1 x daily - 7 x weekly - 2-3 sets - 10 reps  - Bilateral Short Arc Quad Set  - 1 x daily - 7 x weekly - 2-3 sets - 10 reps  - Prone Quadriceps Stretch with Strap  - 1 x daily - 7 x weekly - 3 reps - 30sec hold  - Hip Abduction with Resistance Loop  - 1 x daily - 7 x weekly - 1-3 sets - 10 reps  - Heel Toe Raises with Counter Support  - 1 x daily - 7 x weekly - 1 sets - 10 reps             "

## 2025-06-25 ENCOUNTER — OFFICE VISIT (OUTPATIENT)
Dept: PHYSICAL THERAPY | Facility: CLINIC | Age: 75
End: 2025-06-25
Attending: ORTHOPAEDIC SURGERY
Payer: COMMERCIAL

## 2025-06-25 DIAGNOSIS — Z96.652 S/P TOTAL KNEE REPLACEMENT, LEFT: ICD-10-CM

## 2025-06-25 DIAGNOSIS — Z96.651 S/P TOTAL KNEE REPLACEMENT, RIGHT: Primary | ICD-10-CM

## 2025-06-25 PROCEDURE — 97112 NEUROMUSCULAR REEDUCATION: CPT

## 2025-06-25 PROCEDURE — 97110 THERAPEUTIC EXERCISES: CPT

## 2025-06-25 NOTE — PROGRESS NOTES
"Daily Note     Today's date: 2025  Patient name: Mason Stone  : 1950  MRN: [id number]  Referring provider: Lit Gaines MD  Dx:   Encounter Diagnosis     ICD-10-CM    1. S/P total knee replacement, right  Z96.651       2. S/P total knee replacement, left  Z96.652                      Subjective: pt reports that he still struggle w/ reciprocal steps and getting up from lower surfaces w/o UE help.       Objective: See treatment diary below      Assessment: Tolerated treatment well. Pt dem dec stiffness w/ transition from flex/ext and w/ FSU knee hike after MH and stretching. Pt to cont w/ MH and stretching at home to see how his soreness responds w/ this. Patient would benefit from continued PT      Plan: Continue per plan of care.      Precautions: No past medical history on file.    DOS 25  SOC: 25  FOTO: 25  POC Expiration: 25  Last RE: 25  Daily Treatment Log:  Date 25    Visit # 16 17 18 19     Auth      Auth exp 2025    Manual 10'  10' 10'      Patellar mobs        Scar mobility IASTM to quad & lateral R knee  IASTM to lateral R knee       PF & TF mobs  Inf/sup PF      Distraction w/ mob belt  RB        ITB rolling/Theragun   ITB and HS stick rolling - EG     There Exer  30' 20' 30'     Objective Measures        5xSTS        TUG        Heel slides        Hip extension        Hip abduction        STS/Squat        TKE Std w/ RTB 5\"x10 R; 2x   Std w/ RTB 5\"x10 R; 2x  Std w/ RTB 5\"x15 R     Bike for ROM   Recumb L2x5'  Recumb L2x5'  Recumb L2x5'     LAQ   x10 3\" x10      SAQ  x10  Flex 2x10 R     SLR  x10 1x10 R/L ; 2x 1x10 R/L; 2x     Seated HS curl     Supine w/ sos 20\"x3 w/ MH     Prone quad stretch w/ sos   Manual - WG 3x30s 30\"x3 R     Prone AROM flex         Supine hip flexor stretch w/ sos      W/ MH 20\"x3    Seated Leg press  SL 15# 2x10 R/L   SL 20# 2x10 R/L   SL 20# 2x10 R/L   SL 20# " "2x10 R/L       SLR        Incline Stretch  30\"x3 30\" x3  30\"x3    SL GM hip abduction    1x10; 2x     Heel Raises  x20 2x10      SL Clamshell        HEP        There Activ  10' 10'     FSU W/ knee hike 6\" 1x10 R/L; 2x 1 UE support  6\" 2x10 W/ knee hike 6\" 1x10 R/L; 2x 1 UE support      LSU  Side step 4\" x10 4\" step 1x5 R/L      STS  X10 high low mat                               NMReed    8'     Monster walks         Side stepping         Bridges   x20 1x10  5\"x10; 2x     Hooklying clamshell         HR/TR         WB AP/ML     20x ea     GM hip drop off foam block        Marches         Modalities        CP                         Access Code: [email or web address]  URL: [email or web address]  Date: 05/27/2025  Prepared by: Sona Dumont    Exercises  - Supine Quadricep Sets  - 1 x daily - 7 x weekly - 1-3 sets - 10 reps  - Supine Heel Slide with Strap  - 1 x daily - 7 x weekly - 1-3 sets - 10 reps - 5-10s hold hold  - Hip Extension with Resistance Loop  - 1 x daily - 7 x weekly - 1-3 sets - 10 reps  - Supine Bridge  - 1 x daily - 7 x weekly - 1 sets - 10 reps  - Active Straight Leg Raise with Quad Set  - 1 x daily - 7 x weekly - 2-3 sets - 10 reps  - Bilateral Short Arc Quad Set  - 1 x daily - 7 x weekly - 2-3 sets - 10 reps  - Prone Quadriceps Stretch with Strap  - 1 x daily - 7 x weekly - 3 reps - 30sec hold  - Hip Abduction with Resistance Loop  - 1 x daily - 7 x weekly - 1-3 sets - 10 reps  - Heel Toe Raises with Counter Support  - 1 x daily - 7 x weekly - 1 sets - 10 reps               "

## 2025-07-01 ENCOUNTER — APPOINTMENT (OUTPATIENT)
Dept: PHYSICAL THERAPY | Facility: CLINIC | Age: 75
End: 2025-07-01
Attending: ORTHOPAEDIC SURGERY
Payer: COMMERCIAL

## 2025-07-03 ENCOUNTER — OFFICE VISIT (OUTPATIENT)
Dept: PHYSICAL THERAPY | Facility: CLINIC | Age: 75
End: 2025-07-03
Attending: ORTHOPAEDIC SURGERY
Payer: COMMERCIAL

## 2025-07-03 DIAGNOSIS — Z96.652 S/P TOTAL KNEE REPLACEMENT, LEFT: ICD-10-CM

## 2025-07-03 DIAGNOSIS — Z96.651 S/P TOTAL KNEE REPLACEMENT, RIGHT: Primary | ICD-10-CM

## 2025-07-03 PROCEDURE — 97110 THERAPEUTIC EXERCISES: CPT

## 2025-07-03 NOTE — PROGRESS NOTES
"Daily Note     Today's date: 7/3/2025  Patient name: Mason Stone  : 1950  MRN: [id number]  Referring provider: Lit Gaines MD  Dx:   Encounter Diagnosis     ICD-10-CM    1. S/P total knee replacement, right  Z96.651       2. S/P total knee replacement, left  Z96.652           Start Time: 1145  Stop Time: 1230  Total time in clinic (min): 45 minutes    Subjective: Pt reports to therapy citing max pain levels of \"4 or 5\" that typically occurs when rising from a chair following prolonged (30 minutes) and at the end of days especially if he was active; I.E mowing the lawn.       Objective: See treatment diary below      Assessment: Tolerated treatment well performing B/L leg press up to 60# w/o pain for 3x10. Pt has continued R quad weakness compared to the CL limb and has difficulty squatting past 90 deg at this time. Pt found success w/step up intervention using SPC for UE support; this was added to HEP and HEP was updated in general to reflect current goals of increased knee extension strength w/functional activities.Patient would benefit from continued PT      Plan: Continue per plan of care.      Precautions: No past medical history on file.    DOS 25  SOC: 25  FOTO: 25  POC Expiration: 25  Last RE: 25  Daily Treatment Log:  Date 25   Visit # 16 17 18 19  20      Auth exp 2025   Manual 10'  10' 10'   5'   Patellar mobs        Scar mobility IASTM to quad & lateral R knee  IASTM to lateral R knee       PF & TF mobs  Inf/sup PF      Distraction w/ mob belt  RB        ITB rolling/Theragun   ITB and HS stick rolling - EG  ITB and HS stick rolling - SJ   There Exer  30' 20' 30'  40'   Objective Measures        5xSTS        TUG        Heel slides        Hip extension        Hip abduction        STS/Squat     3x10 from 20 inches; BW   TKE Std w/ RTB 5\"x10 R; 2x   Std w/ RTB 5\"x10 " "R; 2x  Std w/ RTB 5\"x15 R     Bike for ROM   Recumb L2x5'  Recumb L2x5'  Recumb L2x5'  Recumb L2x5'    LAQ   x10 3\" x10   2# CW; 2x10 on R   SAQ  x10  Flex 2x10 R     SLR  x10 1x10 R/L ; 2x 1x10 R/L; 2x  3x10 w/RTB underneath CL foot   Seated HS curl     Supine w/ sos 20\"x3 w/ MH     Prone quad stretch w/ sos   Manual - WG 3x30s 30\"x3 R     Prone AROM flex         Supine HS stretch     30s R/Lx2   Supine hip flexor stretch w/ sos      W/ MH 20\"x3    Seated Leg press  SL 15# 2x10 R/L   SL 20# 2x10 R/L   SL 20# 2x10 R/L   SL 20# 2x10 R/L    BL; 3x10 w/60#   SLR        Incline Stretch  30\"x3 30\" x3  30\"x3    SL GM hip abduction    1x10; 2x     Heel Raises  x20 2x10      SL Clamshell        HEP        There Activ  10' 10'     FSU W/ knee hike 6\" 1x10 R/L; 2x 1 UE support  6\" 2x10 W/ knee hike 6\" 1x10 R/L; 2x 1 UE support   6\" \"up and over\" w/ SPC in CL hand stepping on to foam w/CL limb to encourage R knee extension instead of hip extension   LSU  Side step 4\" x10 4\" step 1x5 R/L      STS  X10 high low mat                               NMReed    8'     Monster walks         Side stepping         Bridges   x20 1x10  5\"x10; 2x     Hooklying clamshell         HR/TR         WB AP/ML     20x ea     GM hip drop off foam block        [facility]        CP                         Access Code: [email or web address]  URL: [email or web address]  Date: 07/03/2025  Prepared by: Jyaant Hood    Exercises  - Supine Heel Slide with Strap  - 1 x daily - 7 x weekly - 1-3 sets - 10 reps - 5-10s hold hold  - Prone Quadriceps Stretch with Strap  - 1 x daily - 7 x weekly - 3 reps - 30sec hold  - Supine Hamstring Stretch with Strap  - 1 x daily - 7 x weekly - 3 sets - 3 reps - 30s hold  - Active Straight Leg Raise with Quad Set  - 1 x daily - 7 x weekly - 2-3 sets - 10 reps  - Side Stepping with Resistance at Feet  - 1 x daily - 7 x weekly - 3 sets - 10 reps  - Squat to chair w/band at knees  - 1 x daily - 7 x weekly - 3 sets - " 10 reps  - Step Up  - 1 x daily - 7 x weekly - 3 sets - 10 reps

## 2025-07-03 NOTE — HOME EXERCISE EDUCATION
Program_ID:[id number]   Access Code: [email or web address]  URL: [email or web address]  Date: 07-  Prepared By: Jayant Hood    Program Notes      Exercises      - Supine Heel Slide with Strap - 1 x daily - 7 x weekly - 1-3 sets - 10 reps - 5-10s hold hold      - Prone Quadriceps Stretch with Strap - 1 x daily - 7 x weekly -  sets - 3 reps - 30sec hold      - Supine Hamstring Stretch with Strap - 1 x daily - 7 x weekly - 3 sets - 3 reps - 30s hold      - Active Straight Leg Raise with Quad Set - 1 x daily - 7 x weekly - 2-3 sets - 10 reps      - Side Stepping with Resistance at Feet - 1 x daily - 7 x weekly - 3 sets - 10 reps      - Squat to chair w/band at knees - 1 x daily - 7 x weekly - 3 sets - 10 reps      - Step Up - 1 x daily - 7 x weekly - 3 sets - 10 reps

## 2025-07-07 ENCOUNTER — OFFICE VISIT (OUTPATIENT)
Dept: PHYSICAL THERAPY | Facility: CLINIC | Age: 75
End: 2025-07-07
Attending: ORTHOPAEDIC SURGERY
Payer: COMMERCIAL

## 2025-07-07 DIAGNOSIS — Z96.651 S/P TOTAL KNEE REPLACEMENT, RIGHT: Primary | ICD-10-CM

## 2025-07-07 PROCEDURE — 97530 THERAPEUTIC ACTIVITIES: CPT

## 2025-07-07 PROCEDURE — 97110 THERAPEUTIC EXERCISES: CPT

## 2025-07-07 NOTE — PROGRESS NOTES
"Daily Note     Today's date: 2025  Patient name: Mason Stone  : 1950  MRN: [id number]  Referring provider: Lit Gaines MD  Dx:   Encounter Diagnosis     ICD-10-CM    1. S/P total knee replacement, right  Z96.651           Start Time: 930  Stop Time: 1010  Total time in clinic (min): 40 minutes    Subjective: pt has no new complaints on arrival to session. He does cont to struggle w/ stairs dakotah descending.       Objective: See treatment diary below      Assessment: Tolerated treatment well. Pt challenged w/ deeper knee bend leg press today, dec weight to accomplish this. Patient exhibited good technique with therapeutic exercises update HEP as needed.       Plan: Continue per plan of care.  Progress note during next visit.  Next visit is pt's last auth visit w/in auth exp date. Pt is going to see how his RE goes but does feel he might be okay to finish up w/ PT      Precautions: No past medical history on file.    DOS 25  SOC: 25  FOTO: 25  POC Expiration: 25  Last RE: 25  Daily Treatment Log:  Date 25   Visit # 21  18 19  20   Auth  24    Auth exp 2025 *  2025   Manual   10'   5'   Patellar mobs        Scar mobility        PF & TF mobs        Distraction w/ mob belt         ITB rolling/Theragun   ITB and HS stick rolling - EG  ITB and HS stick rolling - SJ   There Exer 30'   20' 30'  40'   Objective Measures        5xSTS        TUG        Heel slides        Hip extension        Hip abduction        STS/Squat     3x10 from 20 inches; BW   TKE   Std w/ RTB 5\"x10 R; 2x  Std w/ RTB 5\"x15 R     Bike for ROM  Recumb L3x5'   Recumb L2x5'  Recumb L2x5'  Recumb L2x5'    LAQ  2# CW; 2x10 on R   3\" x10   2# CW; 2x10 on R   SAQ    Flex 2x10 R     SLR   1x10 R/L ; 2x 1x10 R/L; 2x  3x10 w/RTB underneath CL foot   Seated HS curl  RTB 2x10 R    Supine w/ sos 20\"x3 w/ MH     Prone quad stretch w/ sos    3x30s " "30\"x3 R     Prone AROM flex         Supine HS stretch Seated w/ self OP 10\"x5     30s R/Lx2   Supine hip flexor stretch w/ sos      W/ MH 20\"x3    Seated Leg press  B/L 60# 1x10    Seat 4 B/L 45# 1x10  Uni 15# 2x10 R/L   SL 20# 2x10 R/L   SL 20# 2x10 R/L    BL; 3x10 w/60#   SLR        Incline Stretch 30\"x3   30\" x3  30\"x3    SL GM hip abduction    1x10; 2x     Heel Raises Off step 2x10   2x10      SL Clamshell        HEP        There Activ 10'   10'     FSU 6\" 1x6 up and over R     Ant knee pain   W/ knee hike 6\" 1x10 R/L; 2x 1 UE support   6\" \"up and over\" w/ SPC in CL hand stepping on to foam w/CL limb to encourage R knee extension instead of hip extension   LSU 6\" 1x10 R   4\" step 1x5 R/L      STS                                 NMReed    8'     Monster walks         Side stepping         Bridges    1x10  5\"x10; 2x     Hooklying clamshell         HR/TR         WB AP/ML     20x ea     GM hip drop off foam block        Marches         Modalities        CP                         Access Code: [email or web address]  URL: [email or web address]  Date: 07/03/2025  Prepared by: Jayant Hood    Exercises  - Supine Heel Slide with Strap  - 1 x daily - 7 x weekly - 1-3 sets - 10 reps - 5-10s hold hold  - Prone Quadriceps Stretch with Strap  - 1 x daily - 7 x weekly - 3 reps - 30sec hold  - Supine Hamstring Stretch with Strap  - 1 x daily - 7 x weekly - 3 sets - 3 reps - 30s hold  - Active Straight Leg Raise with Quad Set  - 1 x daily - 7 x weekly - 2-3 sets - 10 reps  - Side Stepping with Resistance at Feet  - 1 x daily - 7 x weekly - 3 sets - 10 reps  - Squat to chair w/band at knees  - 1 x daily - 7 x weekly - 3 sets - 10 reps  - Step Up  - 1 x daily - 7 x weekly - 3 sets - 10 reps                   "

## 2025-07-10 ENCOUNTER — EVALUATION (OUTPATIENT)
Dept: PHYSICAL THERAPY | Facility: CLINIC | Age: 75
End: 2025-07-10
Attending: ORTHOPAEDIC SURGERY
Payer: COMMERCIAL

## 2025-07-10 DIAGNOSIS — Z96.651 S/P TOTAL KNEE REPLACEMENT, RIGHT: Primary | ICD-10-CM

## 2025-07-10 DIAGNOSIS — Z96.652 S/P TOTAL KNEE REPLACEMENT, LEFT: ICD-10-CM

## 2025-07-10 PROCEDURE — 97110 THERAPEUTIC EXERCISES: CPT

## 2025-07-10 NOTE — PROGRESS NOTES
"PT Evaluation     Today's date: 7/10/2025  Patient name: Mason Stone  : 1950  MRN: [id number]  Referring provider: Lit Gaines MD  Dx:   Encounter Diagnosis     ICD-10-CM    1. S/P total knee replacement, right  Z96.651       2. S/P total knee replacement, left  Z96.652                 Start Time: 1015  Stop Time: 1100  Total time in clinic (min): 45 minutes    Assessment  Impairments: abnormal gait, abnormal or restricted ROM, activity intolerance, impaired physical strength, lacks appropriate home exercise program, pain with function, weight-bearing intolerance, poor posture  and poor body mechanics  Symptom irritability: low    Assessment details: 7/10/25: RE  Pt has now attended 22 skilled PT sessions since his R TKA. He notes significant reduction in resting pain (now 0/10) and pain w/ambulation (0/10). Pt notes continues challenge w/ SL support most often presenting w/ stair negotiation descending>ascending. Pt continues to demonstrate appropriate ROM including no presence of R sided Qlag. However, MMT tell a different story as pt has yet to surpass 4/5 MMT w/ R knee extension and continues to have pain w/this motion lateral to the R patella. ITB IASTM, HS stretching, and patellar mobs all failed to reduce his pain when moving into extension. Pt found moderate pain relief w/increased DF stretching and his R ankle DF was measure at 0 deg. This could be impacting function about the R knee joint and impacting pt's current pain and \"sticking\" when moving into extension. Plan to prioritize this ROM moving forward as pt plans for D/C to HEP in his next 2 visits. HEP updated to encourage R knee extension strength and R ankle DF mobility.      25:   Pt presents to his RE making moderate strength gains while increasing R knee flexion ROM  within 2 deg of the C/L limb. Pt can also reach terminal extension w/o pain and can maintain this extension t/o a quad set. That being said MMT/break testing of the R " quad remains a painful 4/5; the same as last RE. Pt additionally continues to experience pain when moving from appx 60 deg flexion to terminal knee extension in supine. This pain is less per pt than last RE however still consistent in it's frequency of presentation.  During the RE pt demonstrated pain when moving into R hip flexion during MMT that presented just inferiorly to the R knee cap. ANANDA, FADDIR, and Scour testing of the R hip took place and all but FADDIR were positive. Additional angely testing was negative B/L as well. ITB rolling and and IASTM to musculature surrounding the ITB significantly reduced his anterior R knee pain and increased R hip flexion AROM in sitting during retest of MMT. R LAD did not have any effect on pain. Plan to continue release of R ITB in addition to strengthening of lateral hip musculature to reduce R hip and anterior knee pain w/functional activities moving forward.        5/19: RE  Pt presents to his RE w/significant strength gains while maintaining and improving L knee A/PROM WNL. Pt no longer demonstrates L quad lag w/SLR. Most notable MMTs include a painful 4-/5 w/ L knee flexion though pt has no evident quad lag. Pt was unable to tolerate LAQs w/ 2# citing 7/10 pain. SAQ was then performed w/reduced pain and added to HEP to encourage quad stability. Pt has achieved near symmetry AROM in B/L knee flexion and extension (122 KF L; 125 KF R, 0 KE R/L) though strength deficits prevent reciprocal stair negotiation at this time due to lack of SLS stability. Pt has achieved a moderate level of function due to his improved ROM however is still lacking in L quad stability though lateral hip musculature is increasing. Plan to focus on enhancing quad stability to encourage more typical gait pattern when completing ADLs in addition to promoting reciprocal stair negotiation.  Understanding of Dx/Px/POC: excellent     Prognosis: excellent    Goals  Short Term Goals to be accomplished  in 4 weeks:   STG1: Pt will be I with HEP to maximize progress between therapy sessions-MET 5/19  STG2: Pt will demo 5-10 deg gain of knee extension-MET 5/19  STG3: Pt will demo 1/2 MMT strength in knee extension w/less than 2/10 pain-MET 5/19  STG4: Pt will amb community distance without gait deviates due to pain or ambulatory dysfunction-MET 5/19     Long Term Goals to be accomplished in 16 weeks-EXTENDED DATE: 8/14/25  LTG1: Pt will demo knee strength WNL to return to PLOF of gardening pain free about the R knee-ONGOING 7/10  LTG2: Pt will demo knee AROM WNL to minimize gait deviations when on uneven terrain-MET 6/11  LTG3: Pt will report less than 2/10 pain following 1 hour of yardwork including mowing the lawn-MET 7/10      Plan  Patient would benefit from: PT eval and skilled physical therapy  Planned modality interventions: cryotherapy and thermotherapy: hydrocollator packs    Planned therapy interventions: manual therapy, neuromuscular re-education, self care, therapeutic activities, therapeutic exercise, home exercise program and patient education    Frequency: 2x week  Duration in weeks: 8  Plan of Care beginning date: 4/25/2025  Plan of Care expiration date: 9/18/2025  Treatment plan discussed with: patient  Plan details: HEP development, stretching, strengthening, A/AA/PROM, joint mobilizations, posture education, STM/MI as needed to reduce muscle tension, muscle reeducation, PLOC discussed and agreed upon with patient.          Subjective Evaluation    History of Present Illness  Mechanism of injury: surgery  Mechanism of injury: 7/10/25: RE  Pt reports to therapy having now attended 22 skilled PT session following R TKA. He adds that negotiation of stairs has improved but has not yet been considered pain free. Descending the states remains more difficult than ascending as pt can now ascend reciprocally. He states that his maximum pain is 4/10 and again typically presenting negotiating stairs and when  "performing yardwork on uneven terrain. Pt looks to return to recreational activities and ADLs pain free as PLOF.       RE:   Pt reports to therapy citing current pain rating of \"2 or 3/10\" while noting that his peak pain can reach \"4 or 5/10\". He adds that flat ground ambulation is no longer painful or challenging. Pt notes that ambulation on uneven terrain for >45 minutes can become painful; ie mowing the lawn. He states that stair negotiation >4 inches is difficult to do w/o use of UE support. Pt no longer ambulates w/AD for any type of terrain. Pt looks to continually progress LE strength to make stair negotiation easier in addition to rising from a chair w/o pain and w/o using UE support for standard height chairs (18 inches).    Quality of life: good    Patient Goals  Patient goals for therapy: increased strength, decreased pain, decreased edema, increased motion and return to sport/leisure activities    Pain  Current pain ratin  At best pain ratin  At worst pain ratin  Quality: dull ache and sharp  Relieving factors: change in position and ice  Aggravating factors: stair climbing  Progression: improved    Social Support  Steps to enter house: yes  7  Stairs in house: yes   18  Lives in: multiple-level home  Lives with: spouse    Employment status: working  Hand dominance: ambidextrous    Treatments  Previous treatment: physical therapy        Objective    (*=pain)    ROM    Knee AROM: Degrees      R (07/10/25) L (07/10/25)   Extension(Quad set):  0  0  Extension Lag (SLR):   0  0  Flexion: (Supine/Prone) 125  127    Knee PROM: Degrees      R (07/10/25) L (07/10/25)   Extension (Quad set):  0  0  Extension Lag (SLR):   0  0  Flexion: (Supine/Prone) 125  130      Strength: MMT  Hip    R (07/10/25) L (07/10/25)     IR(Seated/Supine)  4+/5  5/5  ER(Seated/Supine)  4+/5  5/5   Flexion(Seated/Supine) 4/5*  5/5  Abduction(S/L)  4+/5  5/5  Abduction(GM bias)  4/5  4/5    Knee    R (07/10/25) L " "(07/10/25)     Extension(Seated)  4/5*  4+/5  Flexion(Seated)  4+/5  4+/5    Ankle    R (07/10/25) L (07/10/25)    Dorsiflexion(Seated)  5/5  5/5  Plantarflexion(Seated) 5/5  5/5      Observation:  (07/10/25): Pt's incision is no longer covered by steri-strips; appears clean, dry, and intact w/o diffuse ecchymosis.-Remains 7/10/25.    Tenderness/Palpation:  (07/10/25): Pt has mild TTP to the medial border of the R patella-Remains 25.      Function:  (07/10/25)     Ambulation: Pt ambulates w/decreased R sided limp and does not use an AD at baseline; pt adds minor medial knee pain w/stairs.    Squat: Pt requires b/l UE support when rising from/lowering into a chair; can do this much faster though UE support remains at 18 inches.-Remains 7/10      Outcome Measures  TU.67s ()  5xSTS: 15.04s ()    TU.76s ()  5xSTS: 10.22s ()    5xSTS: 11.68s (7/10)   TUG: Deferred; pt has been WNL     Special Testin25  Scour: Positive R  ANANDA: Positive R  FADER: Negative B/L       Precautions: No past medical history on file.    DOS 25  SOC: 25  FOTO: 25  POC Expiration: 25  Last RE: 25  Daily Treatment Log:  Date 7/7/25 7/10/25 2025 2025 7/1/25   Visit # 21 22 18 19  20   Auth     Auth exp 2025 * 2025 * 2025   Manual   10'   5'   Patellar mobs        Scar mobility        PF & TF mobs        Distraction w/ mob belt         ITB rolling/Theragun   ITB and HS stick rolling - EG  ITB and HS stick rolling - SJ   There Exer 30'  40' 20' 30'  40'   Objective Measures  SJ      5xSTS  Performed      TUG        Heel slides        Hip extension        Hip abduction        STS/Squat     3x10 from 20 inches; BW   TKE   Std w/ RTB 5\"x10 R; 2x  Std w/ RTB 5\"x15 R     Bike for ROM  Recumb L3x5'  Recumb L3x5'  Recumb L2x5'  Recumb L2x5'  Recumb L2x5'    LAQ  2# CW; 2x10 on R  2# CW; 2x10 on R  3\" x10   2# CW; 2x10 on R " "  SAQ    Flex 2x10 R     SLR   1x10 R/L ; 2x 1x10 R/L; 2x  3x10 w/RTB underneath CL foot   Seated HS curl  RTB 2x10 R    Supine w/ sos 20\"x3 w/ MH     Prone quad stretch w/ sos    3x30s 30\"x3 R     Prone AROM flex         Supine HS stretch Seated w/ self OP 10\"x5  Seated w/ self OP 10\"x5    30s R/Lx2   Supine hip flexor stretch w/ sos      W/ MH 20\"x3    Seated Leg press  B/L 60# 1x10    Seat 4 B/L 45# 1x10  Uni 15# 2x10 R/L   SL 20# 2x10 R/L   SL 20# 2x10 R/L    BL; 3x10 w/60#   SLR  2x10 2# CW      Incline Stretch 30\"x3   30\" x3  30\"x3    SL GM hip abduction    1x10; 2x     Heel Raises Off step 2x10   2x10      SL Clamshell        DF mobility w/chair  2x10 w/5s hold              HEP  Updated/Reviewed/Given      There Activ 10'   10'     FSU 6\" 1x6 up and over R     Ant knee pain   W/ knee hike 6\" 1x10 R/L; 2x 1 UE support   6\" \"up and over\" w/ SPC in CL hand stepping on to foam w/CL limb to encourage R knee extension instead of hip extension   LSU 6\" 1x10 R   4\" step 1x5 R/L      STS                                 NMReed    8'     Monster walks         Side stepping         Bridges    1x10  5\"x10; 2x     Hooklying clamshell         HR/TR         WB AP/ML     20x ea     GM hip drop off foam block        Shannon         [facility]        CP                         Access Code: [email or web address]  URL: [email or web address]  Date: 07/10/2025  Prepared by: Jayant Hood    Exercises  - Supine Heel Slide with Strap  - 1 x daily - 7 x weekly - 1-3 sets - 10 reps - 5-10s hold hold  - Prone Quadriceps Stretch with Strap  - 1 x daily - 7 x weekly - 3 reps - 30sec hold  - Supine Hamstring Stretch with Strap  - 1 x daily - 7 x weekly - 3 sets - 3 reps - 30s hold  - Active Straight Leg Raise with Quad Set  - 1 x daily - 7 x weekly - 2-3 sets - 10 reps  - Side Stepping with Resistance at Feet  - 1 x daily - 7 x weekly - 3 sets - 10 reps  - Squat to chair w/band at knees  - 1 x daily - 7 x weekly - 3 sets - 10 reps  - Step " Up  - 1 x daily - 7 x weekly - 3 sets - 10 reps  - Gastroc Stretch with Foot at Wall  - 1 x daily - 7 x weekly - 3 sets - 3 reps - 20-30s hold  - Sitting Knee Extension with Resistance  - 1 x daily - 7 x weekly - 3 sets - 10 reps

## 2025-07-14 ENCOUNTER — OFFICE VISIT (OUTPATIENT)
Dept: PHYSICAL THERAPY | Facility: CLINIC | Age: 75
End: 2025-07-14
Attending: ORTHOPAEDIC SURGERY
Payer: COMMERCIAL

## 2025-07-14 DIAGNOSIS — Z96.652 S/P TOTAL KNEE REPLACEMENT, LEFT: ICD-10-CM

## 2025-07-14 DIAGNOSIS — Z96.651 S/P TOTAL KNEE REPLACEMENT, RIGHT: Primary | ICD-10-CM

## 2025-07-14 PROCEDURE — 97530 THERAPEUTIC ACTIVITIES: CPT

## 2025-07-14 PROCEDURE — 97110 THERAPEUTIC EXERCISES: CPT

## 2025-07-14 PROCEDURE — 97112 NEUROMUSCULAR REEDUCATION: CPT

## 2025-07-14 NOTE — PROGRESS NOTES
"Daily Note     Today's date: 2025  Patient name: Mason Stone  : 1950  MRN: [id number]  Referring provider: Lit Gaines MD  Dx:   Encounter Diagnosis     ICD-10-CM    1. S/P total knee replacement, right  Z96.651       2. S/P total knee replacement, left  Z96.652                      Subjective: pt has no new complaints on arrival to session today.       Objective: See treatment diary below      Assessment: Tolerated treatment well. Pt remains challenged w/ current TE. No issues w/ FSU, limited in TRX squats mostly due to L knee pain > R knee pain. Cont to progress global LE strengthening and monitor progressions in pain as pt is pretty functional at this time as he prepares for L TKA. Patient exhibited good technique with therapeutic exercises      Plan: Continue per plan of care.      Precautions: No past medical history on file.    DOS 25  SOC: 25  FOTO: 25  POC Expiration: 25  Last RE: 25  Daily Treatment Log:  Date 7/7/25 7/10/25 2025  7/1/25   Visit # 21 22 23  20   Auth  21/24 22/24 23/24  20/24   Auth exp 2025 * 2025 * 2025   Manual     5'   Patellar mobs        Scar mobility        PF & TF mobs        Distraction w/ mob belt         ITB rolling/Theragun     ITB and HS stick rolling - SJ   There Exer 30'  40' 20'   40'   Objective Measures  SJ      5xSTS  Performed      TUG        Heel slides        Hip extension        Hip abduction        STS/Squat     3x10 from 20 inches; BW   TKE        Bike for ROM  Recumb L3x5'  Recumb L3x5'  Recumb L3x5'   Recumb L2x5'    LAQ  2# CW; 2x10 on R  2# CW; 2x10 on R  2# 5\" 2x10   2# CW; 2x10 on R   SAQ        SLR   Flex 1.5# CW 2x10   3x10 w/RTB underneath CL foot   Seated HS curl  RTB 2x10 R        Prone quad stretch w/ sos         Prone AROM flex         Supine HS stretch Seated w/ self OP 10\"x5  Seated w/ self OP 10\"x5  Supine w/ sos 30\"x3 R  30s R/Lx2   Supine hip flexor stretch w/ sos        " "  Seated Leg press  B/L 60# 1x10    Seat 4 B/L 45# 1x10  Uni 15# 2x10 R/L   SL 25# 2x10 R/L    BL; 3x10 w/60#   SLR  2x10 2# CW      Incline Stretch 30\"x3   30\" x3      SL GM hip abduction   2x10      Heel Raises Off step 2x10   SL on leg press 20# 2x10      SL Clamshell        DF mobility w/chair  2x10 w/5s hold              HEP  Updated/Reviewed/Given      There Activ 10'   10'     FSU 6\" 1x6 up and over R     Ant knee pain   W/ knee hike 6\" 1x10 R/L; 2x    6\" \"up and over\" w/ SPC in CL hand stepping on to foam w/CL limb to encourage R knee extension instead of hip extension   LSU 6\" 1x10 R        STS   TRX squats to chair+2\" foam 1x6   *L knee pain                               NMReed   10'      Monster walks         Side stepping         Bridges    5\"x10; 2x      Hooklying clamshell         HR/TR         WB AP/ML    20x ea      GM hip drop off foam block        Marches         Modalities        CP                         Access Code: [email or web address]  URL: [email or web address]  Date: 07/10/2025  Prepared by: Jayant Hood    Exercises  - Supine Heel Slide with Strap  - 1 x daily - 7 x weekly - 1-3 sets - 10 reps - 5-10s hold hold  - Prone Quadriceps Stretch with Strap  - 1 x daily - 7 x weekly - 3 reps - 30sec hold  - Supine Hamstring Stretch with Strap  - 1 x daily - 7 x weekly - 3 sets - 3 reps - 30s hold  - Active Straight Leg Raise with Quad Set  - 1 x daily - 7 x weekly - 2-3 sets - 10 reps  - Side Stepping with Resistance at Feet  - 1 x daily - 7 x weekly - 3 sets - 10 reps  - Squat to chair w/band at knees  - 1 x daily - 7 x weekly - 3 sets - 10 reps  - Step Up  - 1 x daily - 7 x weekly - 3 sets - 10 reps  - Gastroc Stretch with Foot at Wall  - 1 x daily - 7 x weekly - 3 sets - 3 reps - 20-30s hold  - Sitting Knee Extension with Resistance  - 1 x daily - 7 x weekly - 3 sets - 10 reps           "

## 2025-07-16 ENCOUNTER — OFFICE VISIT (OUTPATIENT)
Dept: PHYSICAL THERAPY | Facility: CLINIC | Age: 75
End: 2025-07-16
Attending: ORTHOPAEDIC SURGERY
Payer: COMMERCIAL

## 2025-07-16 DIAGNOSIS — Z96.651 S/P TOTAL KNEE REPLACEMENT, RIGHT: Primary | ICD-10-CM

## 2025-07-16 PROCEDURE — 97530 THERAPEUTIC ACTIVITIES: CPT

## 2025-07-16 PROCEDURE — 97112 NEUROMUSCULAR REEDUCATION: CPT

## 2025-07-16 PROCEDURE — 97110 THERAPEUTIC EXERCISES: CPT

## 2025-07-16 NOTE — PROGRESS NOTES
"Daily Note     Today's date: 2025  Patient name: Mason Stone  : 1950  MRN: [id number]  Referring provider: Lit Gaines MD  Dx:   Encounter Diagnosis     ICD-10-CM    1. S/P total knee replacement, right  Z96.651                      Subjective: pt has no new complaints on arrival to session.       Objective: See treatment diary below      Assessment: Tolerated treatment well. Pt challenged w/ eccentric quad control during lateral heel taps. Cont to progress B/L LE strengthening as able. Challenged w/ SL bridging progression.  Patient would benefit from continued PT      Plan: Continue per plan of care.      Precautions: No past medical history on file.    DOS 25  SOC: 25  FOTO: 25  POC Expiration: 25  Last RE: 25  Daily Treatment Log:  Date 7/7/25 7/10/25 2025 2025    Visit # 21 22 23 24    Auth   2224 23/24 24/36    Auth exp 2025 * 2025 * 2025    Manual        Patellar mobs        Scar mobility        PF & TF mobs        Distraction w/ mob belt         ITB rolling/Theragun        There Exer 30'  40' 20'  15'     Objective Measures  SJ      5xSTS  Performed      TUG        Heel slides        Hip extension    GTB @ shins 2x10 R/L     Hip abduction    GTB @ shins 2x10 R/L    STS/Squat        TKE        Bike for ROM  Recumb L3x5'  Recumb L3x5'  Recumb L3x5'  Recumb L3x5'     LAQ  2# CW; 2x10 on R  2# CW; 2x10 on R  2# 5\" 2x10  2# 5\" 2x10 R/L     SAQ        SLR   Flex 1.5# CW 2x10      Seated HS curl  RTB 2x10 R    GTB 2x10 R/L     Prone quad stretch w/ sos         Prone AROM flex         Supine HS stretch Seated w/ self OP 10\"x5  Seated w/ self OP 10\"x5  Supine w/ sos 30\"x3 R Seated w/ self OP 20\"x3     Supine hip flexor stretch w/ sos          Seated Leg press  B/L 60# 1x10    Seat 4 B/L 45# 1x10  Uni 15# 2x10 R/L   SL 25# 2x10 R/L   SL 25# 2x10 R/L       SLR  2x10 2# CW  1x10  R/L     Incline Stretch 30\"x3   30\" x3  30\"x3     SL GM hip " "abduction   2x10      Heel Raises Off step 2x10   SL on leg press 20# 2x10      SL Clamshell        DF mobility w/chair  2x10 w/5s hold              HEP  Updated/Reviewed/Given      There Activ 10'   10' 10'     FSU 6\" 1x6 up and over R     Ant knee pain   W/ knee hike 6\" 1x10 R/L; 2x   W/ knee hike 8\" 1x10 R/L; 2x     LSU 6\" 1x10 R    Lateral heel taps 4\" 1x10 R/L     STS   TRX squats to chair+2\" foam 1x6   *L knee pain                               NMReed   10'  15'     Monster walks         Side stepping         Bridges    5\"x10; 2x  SL bridge opp LE straight (unable to get into figure 4 w/ R knee) 1x10 R/L     Hooklying clamshell         HR/TR     HR off step 2x10     WB AP/ML    20x ea      GM hip drop off foam block        Marches         Modalities        CP                         Access Code: [email or web address]  URL: [email or web address]  Date: 07/10/2025  Prepared by: Jayant Hood    Exercises  - Supine Heel Slide with Strap  - 1 x daily - 7 x weekly - 1-3 sets - 10 reps - 5-10s hold hold  - Prone Quadriceps Stretch with Strap  - 1 x daily - 7 x weekly - 3 reps - 30sec hold  - Supine Hamstring Stretch with Strap  - 1 x daily - 7 x weekly - 3 sets - 3 reps - 30s hold  - Active Straight Leg Raise with Quad Set  - 1 x daily - 7 x weekly - 2-3 sets - 10 reps  - Side Stepping with Resistance at Feet  - 1 x daily - 7 x weekly - 3 sets - 10 reps  - Squat to chair w/band at knees  - 1 x daily - 7 x weekly - 3 sets - 10 reps  - Step Up  - 1 x daily - 7 x weekly - 3 sets - 10 reps  - Gastroc Stretch with Foot at Wall  - 1 x daily - 7 x weekly - 3 sets - 3 reps - 20-30s hold  - Sitting Knee Extension with Resistance  - 1 x daily - 7 x weekly - 3 sets - 10 reps             "